# Patient Record
Sex: FEMALE | Race: WHITE | NOT HISPANIC OR LATINO | URBAN - METROPOLITAN AREA
[De-identification: names, ages, dates, MRNs, and addresses within clinical notes are randomized per-mention and may not be internally consistent; named-entity substitution may affect disease eponyms.]

---

## 2017-01-31 ENCOUNTER — INPATIENT (INPATIENT)
Facility: HOSPITAL | Age: 57
LOS: 12 days | Discharge: NURSING, REHAB OR LONG-TERM CARE | DRG: 917 | End: 2017-02-13
Attending: HOSPITALIST | Admitting: EMERGENCY MEDICINE
Payer: MEDICARE

## 2017-01-31 VITALS
OXYGEN SATURATION: 97 % | DIASTOLIC BLOOD PRESSURE: 90 MMHG | SYSTOLIC BLOOD PRESSURE: 146 MMHG | WEIGHT: 250 LBS | TEMPERATURE: 99 F | HEART RATE: 86 BPM | HEIGHT: 66 IN | RESPIRATION RATE: 22 BRPM

## 2017-01-31 DIAGNOSIS — F20.9 SCHIZOPHRENIA, UNSPECIFIED: ICD-10-CM

## 2017-01-31 DIAGNOSIS — R41.82 ALTERED MENTAL STATUS, UNSPECIFIED: ICD-10-CM

## 2017-01-31 DIAGNOSIS — T42.3X4A: ICD-10-CM

## 2017-01-31 LAB
ALBUMIN SERPL ELPH-MCNC: 4.1 G/DL — SIGNIFICANT CHANGE UP (ref 3.3–5.2)
ALP SERPL-CCNC: 117 U/L — SIGNIFICANT CHANGE UP (ref 40–120)
ALT FLD-CCNC: 14 U/L — SIGNIFICANT CHANGE UP
AMMONIA BLD-MCNC: 18 UMOL/L — SIGNIFICANT CHANGE UP (ref 11–55)
AMPHET UR-MCNC: NEGATIVE — SIGNIFICANT CHANGE UP
ANION GAP SERPL CALC-SCNC: 13 MMOL/L — SIGNIFICANT CHANGE UP (ref 5–17)
APAP SERPL-MCNC: <9.3 UG/ML — LOW (ref 10–26)
APPEARANCE UR: CLEAR — SIGNIFICANT CHANGE UP
APTT BLD: 31.8 SEC — SIGNIFICANT CHANGE UP (ref 27.5–37.4)
AST SERPL-CCNC: 18 U/L — SIGNIFICANT CHANGE UP
BACTERIA # UR AUTO: ABNORMAL
BARBITURATES UR SCN-MCNC: POSITIVE
BASOPHILS # BLD AUTO: 0 K/UL — SIGNIFICANT CHANGE UP (ref 0–0.2)
BASOPHILS NFR BLD AUTO: 0.2 % — SIGNIFICANT CHANGE UP (ref 0–2)
BENZODIAZ UR-MCNC: NEGATIVE — SIGNIFICANT CHANGE UP
BILIRUB SERPL-MCNC: 0.8 MG/DL — SIGNIFICANT CHANGE UP (ref 0.4–2)
BILIRUB UR-MCNC: NEGATIVE — SIGNIFICANT CHANGE UP
BUN SERPL-MCNC: 15 MG/DL — SIGNIFICANT CHANGE UP (ref 8–20)
CALCIUM SERPL-MCNC: 9.1 MG/DL — SIGNIFICANT CHANGE UP (ref 8.6–10.2)
CHLORIDE SERPL-SCNC: 95 MMOL/L — LOW (ref 98–107)
CO2 SERPL-SCNC: 27 MMOL/L — SIGNIFICANT CHANGE UP (ref 22–29)
COCAINE METAB.OTHER UR-MCNC: NEGATIVE — SIGNIFICANT CHANGE UP
COLOR SPEC: YELLOW — SIGNIFICANT CHANGE UP
CREAT SERPL-MCNC: 0.78 MG/DL — SIGNIFICANT CHANGE UP (ref 0.5–1.3)
DIFF PNL FLD: ABNORMAL
EPI CELLS # UR: SIGNIFICANT CHANGE UP
ETHANOL SERPL-MCNC: <10 MG/DL — SIGNIFICANT CHANGE UP
GAS PNL BLDA: SIGNIFICANT CHANGE UP
GLUCOSE SERPL-MCNC: 166 MG/DL — HIGH (ref 70–115)
GLUCOSE UR QL: NEGATIVE MG/DL — SIGNIFICANT CHANGE UP
HCT VFR BLD CALC: 42.5 % — SIGNIFICANT CHANGE UP (ref 37–47)
HGB BLD-MCNC: 13.8 G/DL — SIGNIFICANT CHANGE UP (ref 12–16)
INR BLD: 1.14 RATIO — SIGNIFICANT CHANGE UP (ref 0.88–1.16)
KETONES UR-MCNC: NEGATIVE — SIGNIFICANT CHANGE UP
LACTATE BLDV-MCNC: 2 MMOL/L — HIGH (ref 0.5–1.6)
LEUKOCYTE ESTERASE UR-ACNC: NEGATIVE — SIGNIFICANT CHANGE UP
LYMPHOCYTES # BLD AUTO: 1 K/UL — SIGNIFICANT CHANGE UP (ref 1–4.8)
LYMPHOCYTES # BLD AUTO: 15 % — LOW (ref 20–55)
MCHC RBC-ENTMCNC: 29.4 PG — SIGNIFICANT CHANGE UP (ref 27–31)
MCHC RBC-ENTMCNC: 32.5 G/DL — SIGNIFICANT CHANGE UP (ref 32–36)
MCV RBC AUTO: 90.6 FL — SIGNIFICANT CHANGE UP (ref 81–99)
METHADONE UR-MCNC: NEGATIVE — SIGNIFICANT CHANGE UP
MONOCYTES # BLD AUTO: 0.9 K/UL — HIGH (ref 0–0.8)
MONOCYTES NFR BLD AUTO: 14.2 % — HIGH (ref 3–10)
NEUTROPHILS # BLD AUTO: 4.7 K/UL — SIGNIFICANT CHANGE UP (ref 1.8–8)
NEUTROPHILS NFR BLD AUTO: 70.4 % — SIGNIFICANT CHANGE UP (ref 37–73)
NITRITE UR-MCNC: NEGATIVE — SIGNIFICANT CHANGE UP
OPIATES UR-MCNC: NEGATIVE — SIGNIFICANT CHANGE UP
PCP SPEC-MCNC: SIGNIFICANT CHANGE UP
PCP UR-MCNC: NEGATIVE — SIGNIFICANT CHANGE UP
PH UR: 6 — SIGNIFICANT CHANGE UP (ref 4.8–8)
PLATELET # BLD AUTO: 164 K/UL — SIGNIFICANT CHANGE UP (ref 150–400)
POTASSIUM SERPL-MCNC: 3.8 MMOL/L — SIGNIFICANT CHANGE UP (ref 3.5–5.3)
POTASSIUM SERPL-SCNC: 3.8 MMOL/L — SIGNIFICANT CHANGE UP (ref 3.5–5.3)
PROT SERPL-MCNC: 6.6 G/DL — SIGNIFICANT CHANGE UP (ref 6.6–8.7)
PROT UR-MCNC: NEGATIVE MG/DL — SIGNIFICANT CHANGE UP
PROTHROM AB SERPL-ACNC: 12.6 SEC — SIGNIFICANT CHANGE UP (ref 10–13.1)
RAPID RVP RESULT: SIGNIFICANT CHANGE UP
RBC # BLD: 4.69 M/UL — SIGNIFICANT CHANGE UP (ref 4.4–5.2)
RBC # FLD: 14.2 % — SIGNIFICANT CHANGE UP (ref 11–15.6)
RBC CASTS # UR COMP ASSIST: ABNORMAL /HPF (ref 0–4)
SALICYLATES SERPL-MCNC: <2 MG/DL — LOW (ref 10–20)
SODIUM SERPL-SCNC: 135 MMOL/L — SIGNIFICANT CHANGE UP (ref 135–145)
SP GR SPEC: 1.01 — SIGNIFICANT CHANGE UP (ref 1.01–1.02)
THC UR QL: NEGATIVE — SIGNIFICANT CHANGE UP
UROBILINOGEN FLD QL: NEGATIVE MG/DL — SIGNIFICANT CHANGE UP
WBC # BLD: 6.61 K/UL — SIGNIFICANT CHANGE UP (ref 4.8–10.8)
WBC # FLD AUTO: 6.61 K/UL — SIGNIFICANT CHANGE UP (ref 4.8–10.8)
WBC UR QL: SIGNIFICANT CHANGE UP

## 2017-01-31 PROCEDURE — 72125 CT NECK SPINE W/O DYE: CPT | Mod: 26

## 2017-01-31 PROCEDURE — 93010 ELECTROCARDIOGRAM REPORT: CPT

## 2017-01-31 PROCEDURE — 99291 CRITICAL CARE FIRST HOUR: CPT

## 2017-01-31 PROCEDURE — 70450 CT HEAD/BRAIN W/O DYE: CPT | Mod: 26

## 2017-01-31 PROCEDURE — 99222 1ST HOSP IP/OBS MODERATE 55: CPT

## 2017-01-31 PROCEDURE — 71010: CPT | Mod: 26

## 2017-01-31 PROCEDURE — 95819 EEG AWAKE AND ASLEEP: CPT | Mod: 26

## 2017-01-31 RX ORDER — CALCIUM GLUCONATE 100 MG/ML
2 VIAL (ML) INTRAVENOUS ONCE
Qty: 0 | Refills: 0 | Status: DISCONTINUED | OUTPATIENT
Start: 2017-01-31 | End: 2017-01-31

## 2017-01-31 RX ORDER — ENOXAPARIN SODIUM 100 MG/ML
40 INJECTION SUBCUTANEOUS EVERY 24 HOURS
Qty: 0 | Refills: 0 | Status: DISCONTINUED | OUTPATIENT
Start: 2017-01-31 | End: 2017-02-05

## 2017-01-31 RX ORDER — SODIUM CHLORIDE 9 MG/ML
3 INJECTION INTRAMUSCULAR; INTRAVENOUS; SUBCUTANEOUS ONCE
Qty: 0 | Refills: 0 | Status: COMPLETED | OUTPATIENT
Start: 2017-01-31 | End: 2017-01-31

## 2017-01-31 RX ORDER — SODIUM CHLORIDE 9 MG/ML
1000 INJECTION, SOLUTION INTRAVENOUS
Qty: 0 | Refills: 0 | Status: DISCONTINUED | OUTPATIENT
Start: 2017-01-31 | End: 2017-02-01

## 2017-01-31 RX ADMIN — ENOXAPARIN SODIUM 40 MILLIGRAM(S): 100 INJECTION SUBCUTANEOUS at 07:27

## 2017-01-31 RX ADMIN — SODIUM CHLORIDE 3 MILLILITER(S): 9 INJECTION INTRAMUSCULAR; INTRAVENOUS; SUBCUTANEOUS at 03:03

## 2017-01-31 RX ADMIN — SODIUM CHLORIDE 100 MILLILITER(S): 9 INJECTION, SOLUTION INTRAVENOUS at 07:26

## 2017-01-31 RX ADMIN — Medication 1 MILLIGRAM(S): at 03:03

## 2017-01-31 NOTE — ED PROVIDER NOTE - PROGRESS NOTE DETAILS
awaiting ICU eval and waking up after valium at er awaiting ICU eval and waking up  ativan given in ER after possible seizure.  labs and imaging reviewed. pt evaluated and accepted by ICU.

## 2017-01-31 NOTE — ED PROVIDER NOTE - OBJECTIVE STATEMENT
55 yo female with no known PMH bibems for AMS.  pt last seen nml last nite.   roommate states pt fell out of bed.   pt is unresponsive.  no known trauma or drug use.

## 2017-01-31 NOTE — ED PROVIDER NOTE - NEUROLOGICAL, MLM
lethargic. initially appeared to be neglecting left side, but then started to localize pt with both sides.

## 2017-01-31 NOTE — H&P ADULT. - ATTENDING COMMENTS
56 yof presenting with acute encephalopathy likely secondary to medications.    - Hold sedating medications  - patietn slowly improving  - appreciate psych input  - paratracheal mass seen on CT scan, per patietns  patient as hx of breast ca s/p lumpectomy, chemo and radiation currently [facility], will try to obtain more information and patietn will likely need CT neck and chest for further evaluation 56 yof presenting with acute encephalopathy likely secondary to medications.    - Hold sedating medications  - gerri slowly improving  - appreciate psych input  - ? mass seen on CXR, per patikimberly  patient as hx of breast ca s/p lumpectomy and radiation at [facility], will try to obtain more information and patient will likely need CT neck and chest for further evaluation

## 2017-01-31 NOTE — ED PROVIDER NOTE - MUSCULOSKELETAL, MLM
Spine appears normal, range of motion is not limited, no muscle or joint tenderness Spine appears normal, atraumatic

## 2017-01-31 NOTE — ED ADULT TRIAGE NOTE - CHIEF COMPLAINT QUOTE
pt alert, + AMS incomprehensible sounds, as per ems, unknown last known normal, was found by room mate s/p fall out of bed, normally alert and oriented, blood sugar 130, Dr and code team called to bedside.

## 2017-01-31 NOTE — ED PROVIDER NOTE - CRITICAL CARE PROVIDED
consult w/ pt's family directly relating to pts condition/consultation with other physicians/direct patient care (not related to procedure)/conducted a detailed discussion of DNR status/additional history taking/documentation

## 2017-01-31 NOTE — H&P ADULT. - ASSESSMENT
Impression/Plan:  1) Altered mental status/obtundation: Likely medication related, given her history, presentation and multiple potentially sedating meds which she is on. Additionally the urine tox shows barbituates, but her medication list doesnt reflect a barbituate. I question if this may be related to the lamictal. At present there is no signs of seizures, nor was there evidence on her presentation. An EEG will be performed to exclude non-convulsive state, however without prior history, seizure is less likely in this case. Will hold off on LP at this point, with no meningeal signs, Fever or other sign of infection.    2) Schizophrenia: WIll hold her meds at this point and continue gentle hydration. I have asked psychiatry to evaluate and assist with evaluating her meds.    3) Fall from bed: Neck Ct shows no acute Cervical injury, although there is degenerative changes noted. Will leave Cervical collar in place until more awake and complete examination.  Will need to contact her group home and outside psychiatrist. Will also need to attempt to locate family. Will ask for Social work assistance     I spent a total of 35 mins evaluating and treating this critical patient Impression/Plan:  1) Altered mental status/obtundation: Likely medication related, given her history, presentation and multiple potentially sedating meds which she is on. Additionally the urine tox shows barbituates, but her medication list doesnt reflect a barbituate. I question if this may be related to the lamictal. At present there is no signs of seizures, nor was there evidence on her presentation. An EEG will be performed to exclude non-convulsive state, however without prior history, seizure is less likely in this case. Will hold off on LP at this point, with no meningeal signs, Fever or other sign of infection.    2) Schizophrenia: WIll hold her meds at this point and continue gentle hydration. I have asked psychiatry to evaluate and assist with evaluating her meds.    3) Fall from bed: Neck Ct shows no acute Cervical injury, although there is degenerative changes noted. Will leave Cervical collar in place until more awake and complete examination.    4) R Paratracheal Mass: Will need CT Chest with contrast when awake, to further evaluate. At present it is not compromising the patient or her breathing.    Will need to contact her group home and outside psychiatrist. Will also need to attempt to locate family. Will ask for Social work assistance     I spent a total of 35 mins evaluating and treating this critical patient

## 2017-01-31 NOTE — ED ADULT NURSE REASSESSMENT NOTE - NS ED NURSE REASSESS COMMENT FT1
pt received responsive only to pain. c-collar in place right nasal trumpet in place with o2 via nasal cannula. iv infusing well no redness or swelling

## 2017-01-31 NOTE — H&P ADULT. - HISTORY OF PRESENT ILLNESS
56F with a history of schizoaffective disorder, who lives in a group home. Brought to the ER found having fallen out of bed and being unresponsive. Brought to the ER, with stable BP, HR and comfortably breathing and protecting her airway.  Stat Head Ct was negative for cute CVA/bleed. Her initial labs and EKG were unremarkable, although her urine tox was + for barbituates. The alcohol/acetaminophen/salicylate levels were negative. There were was no fever and no menningeal signs. She was started on IV fluids and brought to the ICU for severe obtundation, with concerns for airway protection.  In reviewing her medication list, she is currently on multiple potentially sedating medications including Clozapine, Seroquel, and Lamicatal.

## 2017-01-31 NOTE — ED PROVIDER NOTE - CARE PLAN
Principal Discharge DX:	Altered mental state  Secondary Diagnosis:	Seizure Principal Discharge DX:	Altered mental state

## 2017-01-31 NOTE — ED PROVIDER NOTE - ENMT, MLM
Airway patent, Nasal mucosa clear. Mouth with normal mucosa. atraumatic. Airway patent, Nasal mucosa clear. Mouth with normal mucosa. atraumatic. has gag but tolerated nasal trumpet.

## 2017-02-01 DIAGNOSIS — I10 ESSENTIAL (PRIMARY) HYPERTENSION: ICD-10-CM

## 2017-02-01 DIAGNOSIS — R22.2 LOCALIZED SWELLING, MASS AND LUMP, TRUNK: ICD-10-CM

## 2017-02-01 DIAGNOSIS — G93.40 ENCEPHALOPATHY, UNSPECIFIED: ICD-10-CM

## 2017-02-01 DIAGNOSIS — T50.902D POISONING BY UNSPECIFIED DRUGS, MEDICAMENTS AND BIOLOGICAL SUBSTANCES, INTENTIONAL SELF-HARM, SUBSEQUENT ENCOUNTER: ICD-10-CM

## 2017-02-01 LAB
CULTURE RESULTS: NO GROWTH — SIGNIFICANT CHANGE UP
SPECIMEN SOURCE: SIGNIFICANT CHANGE UP

## 2017-02-01 PROCEDURE — 99232 SBSQ HOSP IP/OBS MODERATE 35: CPT

## 2017-02-01 PROCEDURE — 99233 SBSQ HOSP IP/OBS HIGH 50: CPT

## 2017-02-01 PROCEDURE — 93010 ELECTROCARDIOGRAM REPORT: CPT

## 2017-02-01 RX ADMIN — SODIUM CHLORIDE 100 MILLILITER(S): 9 INJECTION, SOLUTION INTRAVENOUS at 06:18

## 2017-02-01 RX ADMIN — ENOXAPARIN SODIUM 40 MILLIGRAM(S): 100 INJECTION SUBCUTANEOUS at 06:18

## 2017-02-01 NOTE — PROGRESS NOTE ADULT - ASSESSMENT
56 y F hx schizophrenia, HTN, recently dx'd R breast CA s/p lumpectomy/chemo, adm for AMS, intentional drug OD.     Encephalopathy: 2/2 drug OD, MS improving.   Drug overdose: psychiatry following, 1:1 dc'd. cont to monitor. follow EKG for prolonged QT.   Schizophrenia: f/u psych recs, meds on hold for now  HTN: resume meds as tolerated, BP stable  Lung mass on cxr: f/u ct chest     prophyl: lovenox

## 2017-02-01 NOTE — PROGRESS NOTE ADULT - ASSESSMENT
Pt is a 56 yof with schizophrenia presenting with acute encephalopathy secondary to intentional drug over dose, also incidental finding on cxr ? mass/lymphnode/azygous vein will obtain CT scan

## 2017-02-01 NOTE — PROGRESS NOTE ADULT - SUBJECTIVE AND OBJECTIVE BOX
[facility]  Medicine Accept Note     Chief Complaint: Patient is a 56y old  Female who presents with a chief complaint of Altered mental status (2017 11:26)      PAST MEDICAL & SURGICAL HISTORY:  Schizophrenia  HTN  No significant past surgical history      HPI/OVERNIGHT EVENTS: 56 y F hx schizoaffective d/o, R breast CA s/p recent lumpectomy/chemo,  HTN BIBEMS for altered mental status, intentional drug overdose. Pt denies any active SI, has been seen by psychiatry, 1:1 dc'd.  Denies CP, SOB, N/V/D, dysuria, cough.      MEDICATIONS  (STANDING):  enoxaparin Injectable 40milliGRAM(s) SubCutaneous every 24 hours      Allergies: Flagyl (Rash)      REVIEW OF SYSTEMS:    CONSTITUTIONAL: No fever  ENMT:  No difficulty hearing, tinnitus, vertigo; No sinus or throat pain  NECK: No pain or stiffness  RESPIRATORY: No cough, wheezing, chills or hemoptysis; No shortness of breath  CARDIOVASCULAR: No chest pain, palpitations, dizziness, or leg swelling  GASTROINTESTINAL: No abdominal or epigastric pain. No nausea, vomiting, or hematemesis; No diarrhea or constipation. No melena or hematochezia.  GENITOURINARY: No dysuria, frequency, hematuria, or incontinence  NEUROLOGICAL: No headaches, memory loss, loss of strength, numbness; +chronic hand tremor  SKIN: No itching, burning, rashes, or lesions   MUSCULOSKELETAL: No joint pain or swelling; No muscle, back, or extremity pain       Vital Signs Last 24 Hrs  T(C): 37, Max: 37 (- @ 12:00)  T(F): 98.6, Max: 98.6 (- @ 12:00)  HR: 103 (70 - 103)  BP: 142/92 (115/69 - 142/92)  BP(mean): 102 (85 - 111)  RR: 18 (11 - 58)  SpO2: 98% (94% - 98%)    PHYSICAL EXAM:  Constitutional: alert, NAD  HEENT: EOMI  Neck: supple   Respiratory: CTAB   Cardiovascular: S1 and S2, RRR, no M   Gastrointestinal: BS+, soft, NT/ND  Extremities: No peripheral edema  Vascular: 2+ peripheral pulses  Neurological: A/O x 2 "2007", no focal deficits, +resting hand tremor    Musculoskeletal: 5/5 strength b/l upper and lower extremities  Skin: No rashes        CAPILLARY BLOOD GLUCOSE  126 (2017 07:55)  130 (2017 02:49)    LABS:                        13.8   6.61  )-----------( 164      ( 2017 03:32 )             42.5     2017 03:32    135    |  95     |  15.0   ----------------------------<  166    3.8     |  27.0   |  0.78     Ca    9.1        2017 03:32    TPro  6.6    /  Alb  4.1    /  TBili  0.8    /  DBili  x      /  AST  18     /  ALT  14     /  AlkPhos  117    2017 03:32    PT/INR - ( 2017 03:32 )   PT: 12.6 sec;   INR: 1.14 ratio         PTT - ( 2017 03:32 )  PTT:31.8 sec  Urinalysis Basic - ( 2017 04:18 )    Color: Yellow / Appearance: Clear / S.010 / pH: x  Gluc: x / Ketone: Negative  / Bili: Negative / Urobili: Negative mg/dL   Blood: x / Protein: Negative mg/dL / Nitrite: Negative   Leuk Esterase: Negative / RBC: 3-5 /HPF / WBC 0-2   Sq Epi: x / Non Sq Epi: Few / Bacteria: Occasional    EKG : Normal sinus rhythm  Left axis deviation  Incomplete right bundle branch block  Prolonged QT  Abnormal ECG      RADIOLOGY & ADDITIONAL TESTS:  CXR :  Right tracheobronchial angle and soft tissues a mass of   indeterminate etiology measuring 2.8 cm. Follow-up CT with IV contrast   recommended..

## 2017-02-01 NOTE — PROGRESS NOTE ADULT - SUBJECTIVE AND OBJECTIVE BOX
Patient is a 56y old  Female who presents with a chief complaint of Altered mental status (2017 11:26)      BRIEF HOSPITAL COURSE: Pt is a 56 yof presenting with encephalopathy       Events last 24 hours: MS much improved patient endorsed taking extra medication 3-4 days worth. She states she took them because she thought she was going to have to go to [facility]. She denies suicidal ideation. She states she was treated for her breast CA with lumpectomy and radiation, no chem and she is finished with treatment and has a follow up visit pending    PAST MEDICAL & SURGICAL HISTORY:  Schizophrenia  No significant past surgical history      Review of Systems:  CONSTITUTIONAL: No fever, chills, or fatigue  EYES: No eye pain, visual disturbances, or discharge  ENMT:  No difficulty hearing, tinnitus, vertigo; No sinus or throat pain  NECK: No pain or stiffness  RESPIRATORY: No cough, wheezing, chills or hemoptysis; No shortness of breath  CARDIOVASCULAR: No chest pain, palpitations, dizziness, or leg swelling  GASTROINTESTINAL: No abdominal or epigastric pain. No nausea, vomiting, or hematemesis; No diarrhea or constipation. No melena or hematochezia.  GENITOURINARY: No dysuria, frequency, hematuria, or incontinence  NEUROLOGICAL: No headaches, memory loss, loss of strength, numbness, or tremors  SKIN: No itching, burning, rashes, or lesions   MUSCULOSKELETAL: No joint pain or swelling; No muscle, back, or extremity pain  PSYCHIATRIC: No depression, anxiety, mood swings, or difficulty sleeping      Medications:    enoxaparin Injectable 40milliGRAM(s) SubCutaneous every 24 hours    ICU Vital Signs Last 24 Hrs  T(C): 36.5, Max: 36.5 ( @ 03:00)  T(F): 97.7, Max: 97.7 ( @ 03:00)  HR: 91 (67 - 91)  BP: 129/78 (92/53 - 146/100)  BP(mean): 99 (66 - 115)  ABP: --  ABP(mean): --  RR: 26 (11 - 58)  SpO2: 95% (93% - 98%)      ABG - ( 2017 03:09 )  pH: 7.40  /  pCO2: 44    /  pO2: 221   / HCO3: 26    / Base Excess: 2.2   /  SaO2: 100       I&O's Detail  I & Os for 24h ending 2017 07:00  =============================================  IN:    plasma-Lyte A: 2400 ml    Total IN: 2400 ml  ---------------------------------------------  OUT:    Indwelling Catheter - Urethral: 3255 ml    Intermittent Catheterization - Urethral: 1100 ml    Total OUT: 4355 ml  ---------------------------------------------  Total NET: -1955 ml    I & Os for current day (as of 2017 11:14)  =============================================  IN:    plasma-Lyte A: 300 ml    Total IN: 300 ml  ---------------------------------------------  OUT:    Indwelling Catheter - Urethral: 375 ml    Total OUT: 375 ml  ---------------------------------------------  Total NET: -75 ml        LABS:                        13.8   6.61  )-----------( 164      ( 2017 03:32 )             42.5     2017 03:32    135    |  95     |  15.0   ----------------------------<  166    3.8     |  27.0   |  0.78     Ca    9.1        2017 03:32    TPro  6.6    /  Alb  4.1    /  TBili  0.8    /  DBili  x      /  AST  18     /  ALT  14     /  AlkPhos  117    2017 03:32      CARDIAC MARKERS ( 2017 03:32 )  x     / <0.01 ng/mL / 41 U/L / x     / x          CAPILLARY BLOOD GLUCOSE  126 (2017 07:55)    PT/INR - ( 2017 03:32 )   PT: 12.6 sec;   INR: 1.14 ratio         PTT - ( 2017 03:32 )  PTT:31.8 sec  Urinalysis Basic - ( 2017 04:18 )    Color: Yellow / Appearance: Clear / S.010 / pH: x  Gluc: x / Ketone: Negative  / Bili: Negative / Urobili: Negative mg/dL   Blood: x / Protein: Negative mg/dL / Nitrite: Negative   Leuk Esterase: Negative / RBC: 3-5 /HPF / WBC 0-2   Sq Epi: x / Non Sq Epi: Few / Bacteria: Occasional      CULTURES:  Rapid RVP Result: NotDetec ( @ 05:29)  Culture Results:   No growth ( @ 04:18)      Physical Examination:    General: No acute distress.  Alert, oriented, interactive, nonfocal    HEENT: Pupils equal, reactive to light.  Symmetric.    PULM: Clear to auscultation bilaterally, no significant sputum production    CVS: Regular rate and rhythm, no murmurs, rubs, or gallops    ABD: Soft, nondistended, nontender, normoactive bowel sounds, no masses    EXT: No edema, nontender    SKIN: Warm and well perfused, no rashes noted.

## 2017-02-01 NOTE — PROGRESS NOTE ADULT - SUBJECTIVE AND OBJECTIVE BOX
see written note in chart  patient has capacity to sign informed consent  needs CT chest w contrast  recent diagnosis stage 1 breast cancer  restart seroquel 50 mg BID if EKG OK i.e. QTc not prolonged  hold clozapine

## 2017-02-02 LAB
APPEARANCE UR: CLEAR — SIGNIFICANT CHANGE UP
BILIRUB UR-MCNC: NEGATIVE — SIGNIFICANT CHANGE UP
COLOR SPEC: YELLOW — SIGNIFICANT CHANGE UP
DIFF PNL FLD: ABNORMAL
GLUCOSE UR QL: NEGATIVE MG/DL — SIGNIFICANT CHANGE UP
KETONES UR-MCNC: ABNORMAL
LEUKOCYTE ESTERASE UR-ACNC: ABNORMAL
NITRITE UR-MCNC: NEGATIVE — SIGNIFICANT CHANGE UP
PH UR: 5 — SIGNIFICANT CHANGE UP (ref 4.8–8)
PROT UR-MCNC: 30 MG/DL
SP GR SPEC: 1.02 — SIGNIFICANT CHANGE UP (ref 1.01–1.02)
UROBILINOGEN FLD QL: NEGATIVE MG/DL — SIGNIFICANT CHANGE UP

## 2017-02-02 PROCEDURE — 99231 SBSQ HOSP IP/OBS SF/LOW 25: CPT

## 2017-02-02 PROCEDURE — 93010 ELECTROCARDIOGRAM REPORT: CPT | Mod: 77

## 2017-02-02 PROCEDURE — 99232 SBSQ HOSP IP/OBS MODERATE 35: CPT

## 2017-02-02 PROCEDURE — 99233 SBSQ HOSP IP/OBS HIGH 50: CPT

## 2017-02-02 PROCEDURE — 71250 CT THORAX DX C-: CPT | Mod: 26

## 2017-02-02 RX ORDER — PRIMIDONE 250 MG/1
50 TABLET ORAL
Qty: 0 | Refills: 0 | Status: DISCONTINUED | OUTPATIENT
Start: 2017-02-02 | End: 2017-02-13

## 2017-02-02 RX ORDER — LAMOTRIGINE 25 MG/1
100 TABLET, ORALLY DISINTEGRATING ORAL
Qty: 0 | Refills: 0 | Status: DISCONTINUED | OUTPATIENT
Start: 2017-02-02 | End: 2017-02-03

## 2017-02-02 RX ORDER — SODIUM CHLORIDE 9 MG/ML
1000 INJECTION INTRAMUSCULAR; INTRAVENOUS; SUBCUTANEOUS
Qty: 0 | Refills: 0 | Status: DISCONTINUED | OUTPATIENT
Start: 2017-02-02 | End: 2017-02-04

## 2017-02-02 RX ORDER — PANTOPRAZOLE SODIUM 20 MG/1
40 TABLET, DELAYED RELEASE ORAL
Qty: 0 | Refills: 0 | Status: DISCONTINUED | OUTPATIENT
Start: 2017-02-02 | End: 2017-02-13

## 2017-02-02 RX ORDER — ACETAMINOPHEN 500 MG
650 TABLET ORAL ONCE
Qty: 0 | Refills: 0 | Status: COMPLETED | OUTPATIENT
Start: 2017-02-02 | End: 2017-02-02

## 2017-02-02 RX ADMIN — ENOXAPARIN SODIUM 40 MILLIGRAM(S): 100 INJECTION SUBCUTANEOUS at 06:35

## 2017-02-02 RX ADMIN — LAMOTRIGINE 100 MILLIGRAM(S): 25 TABLET, ORALLY DISINTEGRATING ORAL at 17:14

## 2017-02-02 RX ADMIN — Medication 650 MILLIGRAM(S): at 23:08

## 2017-02-02 RX ADMIN — PANTOPRAZOLE SODIUM 40 MILLIGRAM(S): 20 TABLET, DELAYED RELEASE ORAL at 17:15

## 2017-02-02 RX ADMIN — PRIMIDONE 50 MILLIGRAM(S): 250 TABLET ORAL at 17:14

## 2017-02-02 RX ADMIN — SODIUM CHLORIDE 100 MILLILITER(S): 9 INJECTION INTRAMUSCULAR; INTRAVENOUS; SUBCUTANEOUS at 23:08

## 2017-02-02 NOTE — PROGRESS NOTE ADULT - SUBJECTIVE AND OBJECTIVE BOX
Internal Medicine Hospitalist - Dr. Carlie MAYBERRY    [id number]    56y      Female    Patient is a 56y old  Female who presents with a chief complaint of Altered mental status (31 Jan 2017 11:26)    HPI:  56F with a history of schizoaffective disorder, who lives in a group home. Brought to the ER found having fallen out of bed and being unresponsive. Brought to the ER, with stable BP, HR and comfortably breathing and protecting her airway.  Stat Head Ct was negative for cute CVA/bleed. Her initial labs and EKG were unremarkable, although her urine tox was + for barbituates. The alcohol/acetaminophen/salicylate levels were negative. There were was no fever and no menningeal signs. She was started on IV fluids and brought to the ICU for severe obtundation, with concerns for airway protection.  In reviewing her medication list, she is currently on multiple potentially sedating medications including Clozapine, Seroquel, and Lamicatal. (31 Jan 2017 11:26)      INTERVAL HPI/ OVERNIGHT EVENTS: Patient is seen and examined, pt is awake but confused, cont to talk about brother and niece, denied abd. pain, nausea, vomiting, chest pain.     REVIEW OF SYSTEMS:    Denied fever, chills, abd. pain, nausea, vomiting, chest pain, SOB, headache, dizziness    PHYSICAL EXAM:    Vital Signs Last 24 Hrs  T(C): 31.7, Max: 37 (02-01 @ 12:00)  T(F): 89, Max: 98.6 (02-01 @ 12:00)  HR: 89 (89 - 103)  BP: 122/85 (115/94 - 143/83)  BP(mean): 102 (97 - 102)  RR: 18 (16 - 31)  SpO2: 98% (95% - 98%)    GENERAL: confused   HEENT: EOMI  Neck: supple  CHEST/LUNG: positive air entry b/l   HEART: S1S2+ audible  ABDOMEN: Soft, Nontender, Nondistended; Bowel sounds present  EXTREMITIES:  no edema  CNS: AAO X 3  Psychiatry: normal mood    LABS: No new labs       MEDICATIONS  (STANDING):  enoxaparin Injectable 40milliGRAM(s) SubCutaneous every 24 hours    MEDICATIONS  (PRN):      RADIOLOGY & ADDITIONAL TEST

## 2017-02-02 NOTE — PROGRESS NOTE ADULT - SUBJECTIVE AND OBJECTIVE BOX
see written note in chart  impaired decision making   restart Mysoline and Lamictal  surrogate consent from brother  check EKG due to prolonged QT interval

## 2017-02-02 NOTE — PROGRESS NOTE ADULT - ASSESSMENT
56 y F hx schizophrenia, HTN, recently dx'd R breast CA s/p lumpectomy/chemo, adm for AMS, intentional drug OD.     A/P    >Encephalopathy: 2/2 drug OD, MS awake, oriented, but confused  appreciate psychiatry input, 1:1 dc'd. cont to monitor. EKG 02/01/2017 prolong qtc, repeat ekg today     >Schizophrenia: f/u psych recs, meds on hold for now    >HTN: Stable   monitor BP, cont. to hold medication for now     >Lung mass on cxr  Pt is awake but confused, will wait for psych to see if pt has capacity to make decision, no NOK listed in chart  if patient has no capacity, will do CT chest without contrast     >DVT: lovenox

## 2017-02-03 LAB
ANION GAP SERPL CALC-SCNC: 19 MMOL/L — HIGH (ref 5–17)
BACTERIA # UR AUTO: ABNORMAL
BUN SERPL-MCNC: 15 MG/DL — SIGNIFICANT CHANGE UP (ref 8–20)
CALCIUM SERPL-MCNC: 9.1 MG/DL — SIGNIFICANT CHANGE UP (ref 8.6–10.2)
CHLORIDE SERPL-SCNC: 106 MMOL/L — SIGNIFICANT CHANGE UP (ref 98–107)
CO2 SERPL-SCNC: 21 MMOL/L — LOW (ref 22–29)
CREAT SERPL-MCNC: 0.61 MG/DL — SIGNIFICANT CHANGE UP (ref 0.5–1.3)
EPI CELLS # UR: SIGNIFICANT CHANGE UP
GLUCOSE SERPL-MCNC: 108 MG/DL — SIGNIFICANT CHANGE UP (ref 70–115)
HCT VFR BLD CALC: 42.7 % — SIGNIFICANT CHANGE UP (ref 37–47)
HCT VFR BLD CALC: 43.9 % — SIGNIFICANT CHANGE UP (ref 37–47)
HGB BLD-MCNC: 14.1 G/DL — SIGNIFICANT CHANGE UP (ref 12–16)
HGB BLD-MCNC: 14.3 G/DL — SIGNIFICANT CHANGE UP (ref 12–16)
LACTATE BLDV-MCNC: 0.8 MMOL/L — SIGNIFICANT CHANGE UP (ref 0.5–1.6)
LACTATE BLDV-MCNC: 0.9 MMOL/L — SIGNIFICANT CHANGE UP (ref 0.5–1.6)
LYMPHOCYTES # BLD AUTO: 8 % — LOW (ref 20–55)
MAGNESIUM SERPL-MCNC: 2 MG/DL — SIGNIFICANT CHANGE UP (ref 1.8–2.5)
MCHC RBC-ENTMCNC: 29.6 PG — SIGNIFICANT CHANGE UP (ref 27–31)
MCHC RBC-ENTMCNC: 30.1 PG — SIGNIFICANT CHANGE UP (ref 27–31)
MCHC RBC-ENTMCNC: 32.6 G/DL — SIGNIFICANT CHANGE UP (ref 32–36)
MCHC RBC-ENTMCNC: 33 G/DL — SIGNIFICANT CHANGE UP (ref 32–36)
MCV RBC AUTO: 90.9 FL — SIGNIFICANT CHANGE UP (ref 81–99)
MCV RBC AUTO: 91.2 FL — SIGNIFICANT CHANGE UP (ref 81–99)
MONOCYTES NFR BLD AUTO: 10 % — SIGNIFICANT CHANGE UP (ref 3–10)
NEUTROPHILS NFR BLD AUTO: 82 % — HIGH (ref 37–73)
PLATELET # BLD AUTO: 166 K/UL — SIGNIFICANT CHANGE UP (ref 150–400)
PLATELET # BLD AUTO: 179 K/UL — SIGNIFICANT CHANGE UP (ref 150–400)
POTASSIUM SERPL-MCNC: 3.4 MMOL/L — LOW (ref 3.5–5.3)
POTASSIUM SERPL-SCNC: 3.4 MMOL/L — LOW (ref 3.5–5.3)
RBC # BLD: 4.68 M/UL — SIGNIFICANT CHANGE UP (ref 4.4–5.2)
RBC # BLD: 4.83 M/UL — SIGNIFICANT CHANGE UP (ref 4.4–5.2)
RBC # FLD: 14.7 % — SIGNIFICANT CHANGE UP (ref 11–15.6)
RBC # FLD: 14.8 % — SIGNIFICANT CHANGE UP (ref 11–15.6)
RBC CASTS # UR COMP ASSIST: ABNORMAL /HPF (ref 0–4)
SODIUM SERPL-SCNC: 146 MMOL/L — HIGH (ref 135–145)
WBC # BLD: 9.29 K/UL — SIGNIFICANT CHANGE UP (ref 4.8–10.8)
WBC # BLD: 9.63 K/UL — SIGNIFICANT CHANGE UP (ref 4.8–10.8)
WBC # FLD AUTO: 9.29 K/UL — SIGNIFICANT CHANGE UP (ref 4.8–10.8)
WBC # FLD AUTO: 9.63 K/UL — SIGNIFICANT CHANGE UP (ref 4.8–10.8)
WBC UR QL: ABNORMAL

## 2017-02-03 PROCEDURE — 99232 SBSQ HOSP IP/OBS MODERATE 35: CPT

## 2017-02-03 PROCEDURE — 99233 SBSQ HOSP IP/OBS HIGH 50: CPT

## 2017-02-03 PROCEDURE — 71010: CPT | Mod: 26

## 2017-02-03 RX ORDER — POTASSIUM CHLORIDE 20 MEQ
40 PACKET (EA) ORAL ONCE
Qty: 0 | Refills: 0 | Status: COMPLETED | OUTPATIENT
Start: 2017-02-03 | End: 2017-02-03

## 2017-02-03 RX ORDER — LAMOTRIGINE 25 MG/1
150 TABLET, ORALLY DISINTEGRATING ORAL
Qty: 0 | Refills: 0 | Status: DISCONTINUED | OUTPATIENT
Start: 2017-02-03 | End: 2017-02-13

## 2017-02-03 RX ADMIN — SODIUM CHLORIDE 100 MILLILITER(S): 9 INJECTION INTRAMUSCULAR; INTRAVENOUS; SUBCUTANEOUS at 18:26

## 2017-02-03 RX ADMIN — Medication 40 MILLIEQUIVALENT(S): at 18:20

## 2017-02-03 RX ADMIN — LAMOTRIGINE 150 MILLIGRAM(S): 25 TABLET, ORALLY DISINTEGRATING ORAL at 19:04

## 2017-02-03 RX ADMIN — ENOXAPARIN SODIUM 40 MILLIGRAM(S): 100 INJECTION SUBCUTANEOUS at 05:20

## 2017-02-03 RX ADMIN — SODIUM CHLORIDE 100 MILLILITER(S): 9 INJECTION INTRAMUSCULAR; INTRAVENOUS; SUBCUTANEOUS at 05:18

## 2017-02-03 RX ADMIN — PRIMIDONE 50 MILLIGRAM(S): 250 TABLET ORAL at 05:14

## 2017-02-03 RX ADMIN — PANTOPRAZOLE SODIUM 40 MILLIGRAM(S): 20 TABLET, DELAYED RELEASE ORAL at 05:14

## 2017-02-03 RX ADMIN — LAMOTRIGINE 100 MILLIGRAM(S): 25 TABLET, ORALLY DISINTEGRATING ORAL at 05:14

## 2017-02-03 RX ADMIN — PRIMIDONE 50 MILLIGRAM(S): 250 TABLET ORAL at 18:20

## 2017-02-03 NOTE — PROGRESS NOTE ADULT - SUBJECTIVE AND OBJECTIVE BOX
[id number]    HPI:  56F with a history of schizoaffective disorder, who lives in a group home. Brought to the ER found having fallen out of bed and being unresponsive. Brought to the ER, with stable BP, HR and comfortably breathing and protecting her airway.  Stat Head Ct was negative for cute CVA/bleed. Her initial labs and EKG were unremarkable, although her urine tox was + for barbituates. The alcohol/acetaminophen/salicylate levels were negative. There were was no fever and no menningeal signs. She was started on IV fluids and brought to the ICU for severe obtundation, with concerns for airway protection.  In reviewing her medication list, she is currently on multiple potentially sedating medications including Clozapine, Seroquel, and Lamicatal. (31 Jan 2017 11:26)      PAST MEDICAL & SURGICAL HISTORY:  Schizophrenia  No significant past surgical history      Flagyl (Rash)      Chief Complaint: rectal temp 100.9.  nursing staff states tachycardic all day. NAD .     ROS: No complaints    Vital Signs Last 24 Hrs  T(C): 36.6, Max: 36.6 (02-02 @ 16:07)  T(F): 97.8, Max: 97.8 (02-02 @ 16:07)  HR: 102 (102 - 118)  BP: 139/89 (118/80 - 139/89)  BP(mean): --  RR: 18 (18 - 22)  SpO2: 93% (93% - 93%)    General: pt alert. nad . breathing easy and unlabored  lungs clear  heart tachy. rr  abd soft      rectal temp 100.9  hr 120  NAD  PO 95% ra      EKG  NS @ 100cc/hr  tylenol  cbc  blood culturesX 2  lactate X2  ua  cxr      Discussed with Dr Sauceda. continue to monitor, no further workup at this time. reasses prn. will sign out to hospitalist team in am

## 2017-02-03 NOTE — PROGRESS NOTE ADULT - ASSESSMENT
56 y F hx schizophrenia, HTN, recently dx'd R breast CA s/p lumpectomy/chemo, adm for AMS, intentional drug OD.     A/P    >Encephalopathy: 2/2 drug OD, MS awake, oriented, but confused  appreciate psychiatry input, 1:1 dc'd. cont to monitor. EKG 02/02/2017- qtc 476 - improved     >Schizophrenia: f/u psych recs, further management as per psych     >Fever - blood c/s resend, no focal symptoms, will hold on Abx, f/u c/s  Ct chest no infiltrate   blood c.s 01/31/2017    >Urinary retention   will d/c martell catheter, voiding trial     >HTN: Stable   monitor BP, cont. to hold medication for now     >Lung mass on cxr  CT chest no lung mass noted, ?  Hyperdense lesion in the right retroareolar breast with associated fat necrosis, will need outpatient f/u- mammogram     >DVT: lovenox 56 y F hx schizophrenia, HTN, recently dx'd R breast CA s/p lumpectomy/chemo, adm for AMS, intentional drug OD.     A/P    >Encephalopathy: 2/2 drug OD, MS awake, oriented, but confused  appreciate psychiatry input, 1:1 dc'd. cont to monitor. EKG 02/02/2017- qtc 476 - improved     >Schizophrenia: f/u psych recs, further management as per psych     >Fever - blood c/s resend, no focal symptoms, will hold on Abx, f/u c/s  Ct chest no infiltrate   blood c.s 01/31/2017    >Urinary retention   will d/c martell catheter, voiding trial     >Hypokalemia - potassium supplement  f/u BMP in am     >HTN: Stable   monitor BP, cont. to hold medication for now     >Lung mass on cxr  CT chest no lung mass noted, ?  Hyperdense lesion in the right retroareolar breast with associated fat necrosis, will need outpatient f/u- mammogram     >DVT: lovenox

## 2017-02-03 NOTE — PROGRESS NOTE ADULT - SUBJECTIVE AND OBJECTIVE BOX
Internal Medicine Hospitalist - Dr. Carlie MAYBERRY    [id number]    56y      Female    Patient is a 56y old  Female who presents with a chief complaint of Altered mental status (2017 11:26)    HPI:  56F with a history of schizoaffective disorder, who lives in a group home. Brought to the ER found having fallen out of bed and being unresponsive. Brought to the ER, with stable BP, HR and comfortably breathing and protecting her airway.  Stat Head Ct was negative for cute CVA/bleed. Her initial labs and EKG were unremarkable, although her urine tox was + for barbituates. The alcohol/acetaminophen/salicylate levels were negative. There were was no fever and no menningeal signs. She was started on IV fluids and brought to the ICU for severe obtundation, with concerns for airway protection.  In reviewing her medication list, she is currently on multiple potentially sedating medications including Clozapine, Seroquel, and Lamicatal. (2017 11:26)      INTERVAL HPI/ OVERNIGHT EVENTS: Patient is seen and examined, pt spike low grade fever last night, also noted to be tachycardiac, afebrile in am    REVIEW OF SYSTEMS:    Denied, chills, abd. pain, nausea, vomiting, chest pain, SOB, headache, dizziness    PHYSICAL EXAM:    Vital Signs Last 24 Hrs  T(C): 37.5, Max: 38.3 (- @ 23:00)  T(F): 99.5, Max: 100.9 (- @ 23:00)  HR: 119 (102 - 129)  BP: 113/76 (113/76 - 139/95)  BP(mean): --  RR: 18 (18 - 18)  SpO2: 95% (95% - 96%)    GENERAL: NAD  HEENT: EOMI  Neck: supple  CHEST/LUNG: Clear to percussion bilaterally; No wheezing  HEART: S1S2+ audible  ABDOMEN: Soft, Nontender, Nondistended; Bowel sounds present  EXTREMITIES:  no edema  CNS: AAO X 3    LABS:                        14.1   9.63  )-----------( 166      ( 2017 05:12 )             42.7     2017 05:12    146    |  106    |  15.0   ----------------------------<  108    3.4     |  21.0   |  0.61     Ca    9.1        2017 05:12  Mg     2.0       2017 05:12        Urinalysis Basic - ( 2017 23:50 )    Color: Yellow / Appearance: Clear / S.025 / pH: x  Gluc: x / Ketone: Large  / Bili: Negative / Urobili: Negative mg/dL   Blood: x / Protein: 30 mg/dL / Nitrite: Negative   Leuk Esterase: Trace / RBC: 3-5 /HPF / WBC 6-10   Sq Epi: x / Non Sq Epi: Occasional / Bacteria: Occasional          MEDICATIONS  (STANDING):  enoxaparin Injectable 40milliGRAM(s) SubCutaneous every 24 hours  pantoprazole    Tablet 40milliGRAM(s) Oral before breakfast  primidone 50milliGRAM(s) Oral two times a day  lamoTRIgine 100milliGRAM(s) Oral two times a day  sodium chloride 0.9%. 1000milliLiter(s) IV Continuous <Continuous>  potassium chloride    Tablet ER 40milliEquivalent(s) Oral once    MEDICATIONS  (PRN):      RADIOLOGY & ADDITIONAL TEST   EXAM:  CT CHEST                        PROCEDURE DATE:  2017   IMPRESSION:     No lung mass or other acute findings. Incidental finding right breast   with recommendations as above.

## 2017-02-04 LAB
ANION GAP SERPL CALC-SCNC: 16 MMOL/L — SIGNIFICANT CHANGE UP (ref 5–17)
APPEARANCE UR: CLEAR — SIGNIFICANT CHANGE UP
BACTERIA # UR AUTO: ABNORMAL
BILIRUB UR-MCNC: ABNORMAL
BUN SERPL-MCNC: 20 MG/DL — SIGNIFICANT CHANGE UP (ref 8–20)
CALCIUM SERPL-MCNC: 8.9 MG/DL — SIGNIFICANT CHANGE UP (ref 8.6–10.2)
CHLORIDE SERPL-SCNC: 106 MMOL/L — SIGNIFICANT CHANGE UP (ref 98–107)
CO2 SERPL-SCNC: 22 MMOL/L — SIGNIFICANT CHANGE UP (ref 22–29)
COLOR SPEC: YELLOW — SIGNIFICANT CHANGE UP
COMMENT - URINE: SIGNIFICANT CHANGE UP
CREAT SERPL-MCNC: 0.55 MG/DL — SIGNIFICANT CHANGE UP (ref 0.5–1.3)
D DIMER BLD IA.RAPID-MCNC: 975 NG/ML DDU — HIGH
DIFF PNL FLD: ABNORMAL
EPI CELLS # UR: SIGNIFICANT CHANGE UP
GLUCOSE SERPL-MCNC: 122 MG/DL — HIGH (ref 70–115)
GLUCOSE UR QL: NEGATIVE MG/DL — SIGNIFICANT CHANGE UP
HCT VFR BLD CALC: 42.3 % — SIGNIFICANT CHANGE UP (ref 37–47)
HGB BLD-MCNC: 13.9 G/DL — SIGNIFICANT CHANGE UP (ref 12–16)
KETONES UR-MCNC: ABNORMAL
LEUKOCYTE ESTERASE UR-ACNC: ABNORMAL
MCHC RBC-ENTMCNC: 29.5 PG — SIGNIFICANT CHANGE UP (ref 27–31)
MCHC RBC-ENTMCNC: 32.9 G/DL — SIGNIFICANT CHANGE UP (ref 32–36)
MCV RBC AUTO: 89.8 FL — SIGNIFICANT CHANGE UP (ref 81–99)
NITRITE UR-MCNC: NEGATIVE — SIGNIFICANT CHANGE UP
PH UR: 5 — SIGNIFICANT CHANGE UP (ref 4.8–8)
PLATELET # BLD AUTO: 178 K/UL — SIGNIFICANT CHANGE UP (ref 150–400)
POTASSIUM SERPL-MCNC: 3.6 MMOL/L — SIGNIFICANT CHANGE UP (ref 3.5–5.3)
POTASSIUM SERPL-SCNC: 3.6 MMOL/L — SIGNIFICANT CHANGE UP (ref 3.5–5.3)
PROT UR-MCNC: 30 MG/DL
RBC # BLD: 4.71 M/UL — SIGNIFICANT CHANGE UP (ref 4.4–5.2)
RBC # FLD: 15 % — SIGNIFICANT CHANGE UP (ref 11–15.6)
RBC CASTS # UR COMP ASSIST: ABNORMAL /HPF (ref 0–4)
SODIUM SERPL-SCNC: 144 MMOL/L — SIGNIFICANT CHANGE UP (ref 135–145)
SP GR SPEC: 1.02 — SIGNIFICANT CHANGE UP (ref 1.01–1.02)
UROBILINOGEN FLD QL: 8 MG/DL
WBC # BLD: 10.86 K/UL — HIGH (ref 4.8–10.8)
WBC # FLD AUTO: 10.86 K/UL — HIGH (ref 4.8–10.8)
WBC UR QL: ABNORMAL

## 2017-02-04 PROCEDURE — 99233 SBSQ HOSP IP/OBS HIGH 50: CPT

## 2017-02-04 PROCEDURE — 99232 SBSQ HOSP IP/OBS MODERATE 35: CPT

## 2017-02-04 RX ORDER — POTASSIUM CHLORIDE 20 MEQ
40 PACKET (EA) ORAL ONCE
Qty: 0 | Refills: 0 | Status: COMPLETED | OUTPATIENT
Start: 2017-02-04 | End: 2017-02-04

## 2017-02-04 RX ORDER — CEFTRIAXONE 500 MG/1
1 INJECTION, POWDER, FOR SOLUTION INTRAMUSCULAR; INTRAVENOUS ONCE
Qty: 0 | Refills: 0 | Status: COMPLETED | OUTPATIENT
Start: 2017-02-04 | End: 2017-02-04

## 2017-02-04 RX ADMIN — ENOXAPARIN SODIUM 40 MILLIGRAM(S): 100 INJECTION SUBCUTANEOUS at 05:54

## 2017-02-04 RX ADMIN — CEFTRIAXONE 100 GRAM(S): 500 INJECTION, POWDER, FOR SOLUTION INTRAMUSCULAR; INTRAVENOUS at 17:18

## 2017-02-04 RX ADMIN — LAMOTRIGINE 150 MILLIGRAM(S): 25 TABLET, ORALLY DISINTEGRATING ORAL at 05:59

## 2017-02-04 RX ADMIN — PRIMIDONE 50 MILLIGRAM(S): 250 TABLET ORAL at 05:54

## 2017-02-04 RX ADMIN — PANTOPRAZOLE SODIUM 40 MILLIGRAM(S): 20 TABLET, DELAYED RELEASE ORAL at 05:54

## 2017-02-04 RX ADMIN — Medication 40 MILLIEQUIVALENT(S): at 17:19

## 2017-02-04 RX ADMIN — LAMOTRIGINE 150 MILLIGRAM(S): 25 TABLET, ORALLY DISINTEGRATING ORAL at 17:22

## 2017-02-04 RX ADMIN — PRIMIDONE 50 MILLIGRAM(S): 250 TABLET ORAL at 17:19

## 2017-02-04 NOTE — CHART NOTE - NSCHARTNOTEFT_GEN_A_CORE
PA was called about urinary retention. The patient had a mratell and was discontinued yesterday. She voided a small amount, but hasn't voided since. RN told me they bladder scanned her and she had 600ml in her bladder. Will instruct to reinsert martell.

## 2017-02-04 NOTE — PROGRESS NOTE ADULT - SUBJECTIVE AND OBJECTIVE BOX
Patient a 55 y/o  female, single, no children, unemployed, and receiving SSI and domiciled at [facility] for 4 years, was BIB/EMS activated by residence as patient was found unresponsive on the floor by her roommate. Patient is alert, unaware of the circumstances leading to the hospitalization. Writer did call @ [phone] [facility] who informed that patient has been getting 2 weeks supply of meds and was medicating herself and they suspect that she might accidentally OD on prescribed Pills. Her meds includes, Clozapine/Seroquel/Lamictal Cogentin/Metoprolol/Aspirin/Zocor/ etc. Patient has normal speech vol/tone, endorsing that her BF was here next to her and is staying there and he was trying to get her on the phone. Ambivalent as she endorsed that she hears voices and when tried to clarify she was quiet. CT Brain/Spine is negative for CVA, labs are WNL. Spoke with her brother Kim @ [phone] who informed that his sister was much better earlier and she tried to commit suicide 4 years back with a serious SA by OD on [address] and [place] [facility] [place]. her brother also mentioned that she was on  haldol for sometime. 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 Her mood is anxious and intermittently asking for her BF fany. Her thoughts are illogical, irrelevant, has some A/h with no S/H/I/P now. She is alert, not oriented to time. Her immediate memory is OK, but was unable to repeat backwords with poor recall. She is also repetitive, sort of delusional at times.    Diagnosis: Delirium--Secondary to Unclear Etiology       Plan: On Lamictal 150 mg BID           May need Haldol 0.5 mg BID for delirium even though she has TD           C/L team to decide for Haldol           Need In-patient Psych Hospitalization for further stability           Thank You

## 2017-02-04 NOTE — PROGRESS NOTE ADULT - SUBJECTIVE AND OBJECTIVE BOX
HPI:  56F with a history of schizoaffective disorder, who lives in a group home. Brought to the ER found having fallen out of bed and being unresponsive. Brought to the ER, with stable BP, HR and comfortably breathing and protecting her airway.  Stat Head Ct was negative for cute CVA/bleed. Her initial labs and EKG were unremarkable, although her urine tox was + for barbituates. The alcohol/acetaminophen/salicylate levels were negative. There were was no fever and no menningeal signs. She was started on IV fluids and brought to the ICU for severe obtundation, with concerns for airway protection.  In reviewing her medication list, she is currently on multiple potentially sedating medications including Clozapine, Seroquel, and Lamicatal. (2017 11:26)    CC: feels bad that she is missing angelica's day, and horrified that her brother private parts were cut off. no other physical complaints, denies urinary burning but some lower abdominal pain intermittently.      INTERVAL HPI/ OVERNIGHT EVENTS: Patient is seen and examined, pt spike low grade fever 2 days ago, also noted to be tachycardiac    REVIEW OF SYSTEMS:    Denied, chills, abd. pain, nausea, vomiting, chest pain, SOB, headache, dizziness    PHYSICAL EXAM:    Vital Signs Last 24 Hrs  T(C): 37.4, Max: 37.5 (02-03 @ 10:54)  T(F): 99.4, Max: 99.5 (02-03 @ 10:54)  HR: 114 (113 - 119)  BP: 115/81 (112/78 - 134/83)  BP(mean): --  RR: 18 (18 - 18)  SpO2: 95% (95% - 95%)    GENERAL: NAD, anxious  HEENT: EOMI  Neck: supple  CHEST/LUNG: Clear to percussion bilaterally; No wheezing  HEART: S1S2+ audible  ABDOMEN: Soft, Nontender, distended; Bowel sounds present  EXTREMITIES:  no edema  CNS: AAO X 1 - self only  - place(thinks this is [place])    LABS:                                              13.9   10.86 )-----------( 178      ( 2017 06:44 )             42.3   2017 06:44    144    |  106    |  20.0   ----------------------------<  122    3.6     |  22.0   |  0.55     Ca    8.9        2017 06:44  Mg     2.0       2017 05:12          Urinalysis Basic - ( 2017 23:50 )    Color: Yellow / Appearance: Clear / S.025 / pH: x  Gluc: x / Ketone: Large  / Bili: Negative / Urobili: Negative mg/dL   Blood: x / Protein: 30 mg/dL / Nitrite: Negative   Leuk Esterase: Trace / RBC: 3-5 /HPF / WBC 6-10   Sq Epi: x / Non Sq Epi: Occasional / Bacteria: Occasional          MEDICATIONS  (STANDING):  enoxaparin Injectable 40milliGRAM(s) SubCutaneous every 24 hours  pantoprazole    Tablet 40milliGRAM(s) Oral before breakfast  primidone 50milliGRAM(s) Oral two times a day  lamoTRIgine 150milliGRAM(s) Oral two times a day    MEDICATIONS  (PRN):      RADIOLOGY & ADDITIONAL TEST   EXAM:  CT CHEST                        PROCEDURE DATE:  2017   IMPRESSION:     No lung mass or other acute findings. Incidental finding right breast   with recommendations as above.

## 2017-02-04 NOTE — PROGRESS NOTE ADULT - ASSESSMENT
56 y F hx schizophrenia, HTN, recently dx'd R breast CA s/p lumpectomy/chemo, adm for AMS, intentional drug OD.     A/P    >Encephalopathy: 2/2 ?impulsive ingestion - drug OD, MS awake, oriented, but confused  appreciate psychiatry input, 1:1 dc'd. cont to monitor. EKG 02/02/2017- qtc 476 - improved     >Schizophrenia: f/u psych recs, further management as per psych - needs inpatient psych hospitalization  pt lacks decision making capacity per psych    >Fever - blood c/s resend, no focal symptoms, will hold on Abx, f/u c/s  Ct chest no infiltrate   blood c.s 01/31/2017  UA dirty - repeat urine culture  will give one dose rocephine for possible UTI, given persistent low grade temp, Tachycardia, urinary retention and lower abd pain    >Urinary retention   failed void trial - now back with martell      >Hypokalemia - potassium supplement  f/u BMP in am     >HTN: Stable   monitor BP, cont. to hold medication for now     >Lung mass on cxr  CT chest no lung mass noted, ?  Hyperdense lesion in the right retroareolar breast with associated fat necrosis, will need outpatient f/u- mammogram     >DVT: lovenox

## 2017-02-05 LAB
ANION GAP SERPL CALC-SCNC: 9 MMOL/L — SIGNIFICANT CHANGE UP (ref 5–17)
BUN SERPL-MCNC: 22 MG/DL — HIGH (ref 8–20)
CALCIUM SERPL-MCNC: 8.7 MG/DL — SIGNIFICANT CHANGE UP (ref 8.6–10.2)
CHLORIDE SERPL-SCNC: 108 MMOL/L — HIGH (ref 98–107)
CO2 SERPL-SCNC: 25 MMOL/L — SIGNIFICANT CHANGE UP (ref 22–29)
CREAT SERPL-MCNC: 0.67 MG/DL — SIGNIFICANT CHANGE UP (ref 0.5–1.3)
CULTURE RESULTS: NO GROWTH — SIGNIFICANT CHANGE UP
CULTURE RESULTS: SIGNIFICANT CHANGE UP
CULTURE RESULTS: SIGNIFICANT CHANGE UP
GLUCOSE SERPL-MCNC: 106 MG/DL — SIGNIFICANT CHANGE UP (ref 70–115)
HCT VFR BLD CALC: 40.5 % — SIGNIFICANT CHANGE UP (ref 37–47)
HGB BLD-MCNC: 12.7 G/DL — SIGNIFICANT CHANGE UP (ref 12–16)
MCHC RBC-ENTMCNC: 29 PG — SIGNIFICANT CHANGE UP (ref 27–31)
MCHC RBC-ENTMCNC: 31.4 G/DL — LOW (ref 32–36)
MCV RBC AUTO: 92.5 FL — SIGNIFICANT CHANGE UP (ref 81–99)
PLATELET # BLD AUTO: 156 K/UL — SIGNIFICANT CHANGE UP (ref 150–400)
POTASSIUM SERPL-MCNC: 3.7 MMOL/L — SIGNIFICANT CHANGE UP (ref 3.5–5.3)
POTASSIUM SERPL-SCNC: 3.7 MMOL/L — SIGNIFICANT CHANGE UP (ref 3.5–5.3)
RBC # BLD: 4.38 M/UL — LOW (ref 4.4–5.2)
RBC # FLD: 14.8 % — SIGNIFICANT CHANGE UP (ref 11–15.6)
SODIUM SERPL-SCNC: 142 MMOL/L — SIGNIFICANT CHANGE UP (ref 135–145)
SPECIMEN SOURCE: SIGNIFICANT CHANGE UP
WBC # BLD: 7.49 K/UL — SIGNIFICANT CHANGE UP (ref 4.8–10.8)
WBC # FLD AUTO: 7.49 K/UL — SIGNIFICANT CHANGE UP (ref 4.8–10.8)

## 2017-02-05 PROCEDURE — 99231 SBSQ HOSP IP/OBS SF/LOW 25: CPT

## 2017-02-05 PROCEDURE — 93970 EXTREMITY STUDY: CPT | Mod: 26

## 2017-02-05 PROCEDURE — 99233 SBSQ HOSP IP/OBS HIGH 50: CPT

## 2017-02-05 RX ORDER — ENOXAPARIN SODIUM 100 MG/ML
113 INJECTION SUBCUTANEOUS EVERY 12 HOURS
Qty: 0 | Refills: 0 | Status: DISCONTINUED | OUTPATIENT
Start: 2017-02-05 | End: 2017-02-13

## 2017-02-05 RX ORDER — SODIUM CHLORIDE 9 MG/ML
1000 INJECTION INTRAMUSCULAR; INTRAVENOUS; SUBCUTANEOUS
Qty: 0 | Refills: 0 | Status: DISCONTINUED | OUTPATIENT
Start: 2017-02-05 | End: 2017-02-06

## 2017-02-05 RX ORDER — CEFTRIAXONE 500 MG/1
1 INJECTION, POWDER, FOR SOLUTION INTRAMUSCULAR; INTRAVENOUS EVERY 24 HOURS
Qty: 0 | Refills: 0 | Status: COMPLETED | OUTPATIENT
Start: 2017-02-05 | End: 2017-02-08

## 2017-02-05 RX ORDER — ACETAMINOPHEN 500 MG
650 TABLET ORAL ONCE
Qty: 0 | Refills: 0 | Status: COMPLETED | OUTPATIENT
Start: 2017-02-05 | End: 2017-02-05

## 2017-02-05 RX ADMIN — SODIUM CHLORIDE 100 MILLILITER(S): 9 INJECTION INTRAMUSCULAR; INTRAVENOUS; SUBCUTANEOUS at 10:42

## 2017-02-05 RX ADMIN — Medication 1 APPLICATION(S): at 17:37

## 2017-02-05 RX ADMIN — LAMOTRIGINE 150 MILLIGRAM(S): 25 TABLET, ORALLY DISINTEGRATING ORAL at 05:48

## 2017-02-05 RX ADMIN — PANTOPRAZOLE SODIUM 40 MILLIGRAM(S): 20 TABLET, DELAYED RELEASE ORAL at 05:49

## 2017-02-05 RX ADMIN — ENOXAPARIN SODIUM 40 MILLIGRAM(S): 100 INJECTION SUBCUTANEOUS at 05:49

## 2017-02-05 RX ADMIN — CEFTRIAXONE 100 GRAM(S): 500 INJECTION, POWDER, FOR SOLUTION INTRAMUSCULAR; INTRAVENOUS at 13:59

## 2017-02-05 RX ADMIN — PRIMIDONE 50 MILLIGRAM(S): 250 TABLET ORAL at 05:49

## 2017-02-05 RX ADMIN — PRIMIDONE 50 MILLIGRAM(S): 250 TABLET ORAL at 17:36

## 2017-02-05 RX ADMIN — SODIUM CHLORIDE 100 MILLILITER(S): 9 INJECTION INTRAMUSCULAR; INTRAVENOUS; SUBCUTANEOUS at 20:52

## 2017-02-05 RX ADMIN — LAMOTRIGINE 150 MILLIGRAM(S): 25 TABLET, ORALLY DISINTEGRATING ORAL at 17:37

## 2017-02-05 RX ADMIN — Medication 650 MILLIGRAM(S): at 09:00

## 2017-02-05 NOTE — CHART NOTE - NSCHARTNOTEFT_GEN_A_CORE
Subjective:  55 y/o female with history of schizophrenia admitted for a fall and AMS presents today with a fever of 100.5 orally. Other vital signs include 100.5 temp rectally, HR 98, /68, RR18 94% room air. She has been having low grade fevers since Feb 02 (99.8-100.9). Extensive work up has been done in the past including blood cultures (negative), CXR (negative), lactate (wnl), EKG, UA (weakly positive). Urine culture was sent yesterday and results are pending. When I saw the patient she was sitting upright in the chair in NAD. She stated nothing was bothering her although she feels weak. She denies CP, fevers, chills, nausea, vomiting, HA, recent vision changes, cough, runny nose, sore throat, abdominal pain, dysuria, urinary frequency.    Objective:  Vital Signs Last 24 Hrs  T(C): 38.1, Max: 38.1 (02-05 @ 07:12)  T(F): 100.5, Max: 100.5 (02-05 @ 07:12)  HR: 98 (98 - 114)  BP: 113/68 (109/77 - 129/86)  RR: 18 (17 - 18)  SpO2: 94% (94% - 94%)       GEN: NAD, pleasant   EYES: PERRL, EOMI  NECK: no lymphadenopathy   MOUTH: Pink, moist, no lesions, no erythema or tonsilar exudates  HEART: tachycardic, no MRG  LUNGS: Clear to auscultation bilateral  ABDOMEN: Soft, nontender to deep palpation, non distended  BACK: no CVA tenderness  SKIN: no Rash                          12.7   7.49  )-----------( 156      ( 05 Feb 2017 05:17 )             40.5         Assessment:   55 y/o female with history of schizophrenia admitted for a fall and AMS presents today with a fever of 100.5 orally. She has been having low grade fevers since Feb 02 at which point she was 100.9. Extensive workup has been done which so far revealed a weakly positive UA, urine culture pending sent Feb 04. She has received one dose of ceftriaxone 1g yesterday for suspected UTI    1. Fever  DDx includes UTI, drug fever, ?breast malignancy    Will order Tylenol 650mg PO once ordered    UTI- The patient is denying symptoms however she has had a martell catheter in place for days. She failed a voiding trial and the martell needed to be reinserted. She is also on Lamictal which can cause UTIs. Urine Culture sent, the patient has received once dose of ceftriaxone.    Drug fever - The patient is currently on primidone which can cause drug fever. Home medication reconciliation does not show the patient was on this medication before it was started on Fed 02. This could be the cause of the patient's fevers. She does not have a rash or skin manifestations on SJS. Patient is also on lamictal.    ?breast malignancy - Recent CT scan showed Hyperdense lesion in the right retroareolar breast with associated fat necrosis, possibly related to prior instrumentation, however recommend correlation with prior breast imaging to exclude underlying mass. This could be the cause of the patient's low grade fevers. The patient is to have an outpatient mammogram.    Case discussed with Dr. Paniagua Subjective:  57 y/o female with history of schizophrenia admitted for a fall and AMS presents today with a fever of 100.5 orally. Other vital signs include 100.5 temp rectally, HR 98, /68, RR18 94% room air. She has been having low grade fevers since Feb 02 (99.8-100.9). Extensive work up has been done in the past including blood cultures (negative), CXR (negative), lactate (wnl), EKG, UA (weakly positive). Urine culture was sent yesterday and results are pending. When I saw the patient she was sitting upright in the chair in NAD. She stated nothing was bothering her although she feels weak. She denies CP, fevers, chills, nausea, vomiting, HA, recent vision changes, cough, runny nose, sore throat, abdominal pain, dysuria, urinary frequency.    Objective:  Vital Signs Last 24 Hrs  T(C): 38.1, Max: 38.1 (02-05 @ 07:12)  T(F): 100.5, Max: 100.5 (02-05 @ 07:12)  HR: 98 (98 - 114)  BP: 113/68 (109/77 - 129/86)  RR: 18 (17 - 18)  SpO2: 94% (94% - 94%)       GEN: NAD, pleasant   EYES: PERRL, EOMI  NECK: no lymphadenopathy   MOUTH: Pink, moist, no lesions, no erythema or tonsilar exudates  HEART: tachycardic, no MRG  LUNGS: Clear to auscultation bilateral  ABDOMEN: Soft, nontender to deep palpation, non distended  BACK: no CVA tenderness  SKIN: no Rash                          12.7   7.49  )-----------( 156      ( 05 Feb 2017 05:17 )             40.5         Assessment:   57 y/o female with history of schizophrenia admitted for a fall and AMS presents today with a fever of 100.5 orally. She has been having low grade fevers since Feb 02 at which point she was 100.9. Extensive workup has been done which so far revealed a weakly positive UA, urine culture pending sent Feb 04. She has received one dose of ceftriaxone 1g yesterday for suspected UTI    1. Fever  DDx includes UTI, drug fever, ?breast malignancy    Will order Tylenol 650mg PO once ordered    UTI- The patient is denying symptoms however she has had a martell catheter in place for days. She failed a voiding trial and the martell needed to be reinserted. She is also on Lamictal which can cause UTIs. Urine Culture sent, the patient has received once dose of ceftriaxone.    Drug fever - The patient is currently on primidone which can cause drug fever. Home medication reconciliation does not show the patient was on this medication before it was started on Fed 02. This could be the cause of the patient's fevers. She does not have a rash or skin manifestations on SJS. Patient is also on lamictal.    ?breast malignancy - Recent CT scan showed Hyperdense lesion in the right retroareolar breast with associated fat necrosis, possibly related to prior instrumentation, however recommend correlation with prior breast imaging to exclude underlying mass. This could be the cause of the patient's low grade fevers. The patient is to have an outpatient mammogram.    Addendum 2/5/17 10:30:  After further review of the patient's chart she has a history of breast CA. She also has low grade fever and elevated d-dimer. Will order venous doppler of LE bilateral.    Case discussed with Dr. Paniagua Subjective:  55 y/o female with history of schizophrenia admitted for a fall and AMS presents today with a fever of 100.5 orally. Other vital signs include 100.5 temp rectally, HR 98, /68, RR18 94% room air. She has been having low grade fevers since Feb 02 (99.8-100.9). Extensive work up has been done in the past including blood cultures (negative), CXR (negative), lactate (wnl), EKG, UA (weakly positive). Urine culture was sent yesterday and results are pending. When I saw the patient she was sitting upright in the chair in NAD. She stated nothing was bothering her although she feels weak. She denies CP, fevers, chills, nausea, vomiting, HA, recent vision changes, cough, runny nose, sore throat, abdominal pain, dysuria, urinary frequency.    Objective:  Vital Signs Last 24 Hrs  T(C): 38.1, Max: 38.1 (02-05 @ 07:12)  T(F): 100.5, Max: 100.5 (02-05 @ 07:12)  HR: 98 (98 - 114)  BP: 113/68 (109/77 - 129/86)  RR: 18 (17 - 18)  SpO2: 94% (94% - 94%)       GEN: NAD, pleasant   EYES: PERRL, EOMI  NECK: no lymphadenopathy   MOUTH: Pink, moist, no lesions, no erythema or tonsilar exudates  HEART: tachycardic, no MRG  LUNGS: Clear to auscultation bilateral  ABDOMEN: Soft, nontender to deep palpation, non distended  BACK: no CVA tenderness  SKIN: no Rash                          12.7   7.49  )-----------( 156      ( 05 Feb 2017 05:17 )             40.5         Assessment:   55 y/o female with history of schizophrenia admitted for a fall and AMS presents today with a fever of 100.5 orally. She has been having low grade fevers since Feb 02 at which point she was 100.9. Extensive workup has been done which so far revealed a weakly positive UA, urine culture pending sent Feb 04. She has received one dose of ceftriaxone 1g yesterday for suspected UTI    1. Fever  DDx includes UTI, drug fever, ?breast malignancy, DVT    Will order Tylenol 650mg PO once ordered    UTI- The patient is denying symptoms however she has had a martell catheter in place for days. She failed a voiding trial and the martell needed to be reinserted. She is also on Lamictal which can cause UTIs. Urine Culture sent, the patient has received once dose of ceftriaxone.    Drug fever - The patient is currently on primidone which can cause drug fever. Home medication reconciliation does not show the patient was on this medication before it was started on Fed 02. This could be the cause of the patient's fevers. She does not have a rash or skin manifestations on SJS. Patient is also on lamictal.    ?breast malignancy - Recent CT scan showed Hyperdense lesion in the right retroareolar breast with associated fat necrosis, possibly related to prior instrumentation, however recommend correlation with prior breast imaging to exclude underlying mass. This could be the cause of the patient's low grade fevers. The patient is to have an outpatient mammogram.    Addendum 2/5/17 10:30:  After further review of the patient's chart she has a history of breast CA. She also has low grade fever and elevated d-dimer. Will order venous doppler of LE bilateral to r/o DVT.    Case discussed with Dr. Paniagua Subjective:  55 y/o female with history of schizophrenia admitted for a fall and AMS presents today with a fever of 100.5 orally. Other vital signs include 100.5 temp rectally, HR 98, /68, RR18 94% room air. She has been having low grade fevers since Feb 02 (99.8-100.9). Extensive work up has been done in the past including blood cultures (negative), CXR (negative), lactate (wnl), EKG, UA (weakly positive). Urine culture was sent yesterday and results are pending. When I saw the patient she was sitting upright in the chair in NAD. She stated nothing was bothering her although she feels weak. She denies CP, fevers, chills, nausea, vomiting, HA, recent vision changes, cough, runny nose, sore throat, abdominal pain, dysuria, urinary frequency.    Objective:  Vital Signs Last 24 Hrs  T(C): 38.1, Max: 38.1 (02-05 @ 07:12)  T(F): 100.5, Max: 100.5 (02-05 @ 07:12)  HR: 98 (98 - 114)  BP: 113/68 (109/77 - 129/86)  RR: 18 (17 - 18)  SpO2: 94% (94% - 94%)       GEN: NAD, pleasant   EYES: PERRL, EOMI  NECK: no lymphadenopathy   MOUTH: Pink, moist, no lesions, no erythema or tonsilar exudates  HEART: tachycardic, no MRG  LUNGS: Clear to auscultation bilateral  ABDOMEN: Soft, nontender to deep palpation, non distended  BACK: no CVA tenderness  SKIN: no Rash                          12.7   7.49  )-----------( 156      ( 05 Feb 2017 05:17 )             40.5         Assessment:   55 y/o female with history of schizophrenia admitted for a fall and AMS presents today with a fever of 100.5 orally. She has been having low grade fevers since Feb 02 at which point she was 100.9. Extensive workup has been done which so far revealed a weakly positive UA, urine culture pending sent Feb 04. She has received one dose of ceftriaxone 1g yesterday for suspected UTI    1. Fever  DDx includes UTI, drug fever, ?breast malignancy, DVT    Will order Tylenol 650mg PO once ordered    UTI- The patient is denying symptoms however she has had a martell catheter in place for days. She failed a voiding trial and the martell needed to be reinserted. She is also on Lamictal which can cause UTIs. Urine Culture sent, the patient has received once dose of ceftriaxone.    Drug fever - The patient is currently on primidone which can cause drug fever. Home medication reconciliation does not show the patient was on this medication before it was started on Fed 02. This could be the cause of the patient's fevers. She does not have a rash or skin manifestations on SJS. Patient is also on lamictal.    ?breast malignancy - Recent CT scan showed Hyperdense lesion in the right retroareolar breast with associated fat necrosis, possibly related to prior instrumentation, however recommend correlation with prior breast imaging to exclude underlying mass. This could be the cause of the patient's low grade fevers. The patient is to have an outpatient mammogram.    Addendum 2/5/17 10:30:  After further review of the patient's chart she has a history of breast CA. She also has low grade fever and elevated d-dimer. Will order venous doppler of LE bilateral to r/o DVT.    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 Vascular surgery was called for recommendations regarding anticoagulation. They recommend full dose anticoagulation and possible warfarin on discharge given her risk factors. Official vascular consult is called in and pending for the AM. Will fully anticoagulate the patient with lovenox 1mg/kg SC BID.    Case discussed with Dr. Paniagua

## 2017-02-05 NOTE — PROGRESS NOTE ADULT - SUBJECTIVE AND OBJECTIVE BOX
HPI:  56F with a history of schizoaffective disorder, who lives in a group home. Brought to the ER found having fallen out of bed and being unresponsive. Brought to the ER, with stable BP, HR and comfortably breathing and protecting her airway.  Stat Head Ct was negative for cute CVA/bleed. Her initial labs and EKG were unremarkable, although her urine tox was + for barbituates. The alcohol/acetaminophen/salicylate levels were negative. There were was no fever and no menningeal signs. She was started on IV fluids and brought to the ICU for severe obtundation, with concerns for airway protection.  In reviewing her medication list, she is currently on multiple potentially sedating medications including Clozapine, Seroquel, and Lamicatal. (2017 11:26)    CC: None today      INTERVAL HPI/ OVERNIGHT EVENTS: Patient is seen and examined, pt spike low grade fever again, also noted to be tachycardiac    REVIEW OF SYSTEMS:    Denied, chills, abd. pain, nausea, vomiting, chest pain, SOB, headache, dizziness    PHYSICAL EXAM:  Vital Signs Last 24 Hrs  T(C): 37.4, Max: 38.1 ( @ 07:12)  T(F): 99.3, Max: 100.5 ( @ 07:12)  HR: 105 (98 - 113)  BP: 109/76 (109/76 - 129/86)  BP(mean): --  RR: 20 (17 - 20)  SpO2: 96% (94% - 96%)      GENERAL: NAD, anxious  HEENT: EOMI  Neck: supple  CHEST/LUNG: Clear to percussion bilaterally; No wheezing  HEART: S1S2+ audible  ABDOMEN: Soft, Nontender, distended; Bowel sounds present  EXTREMITIES:  no edema  CNS: AAO X 1 - self only  - place(thinks this is pilgrim)    LABS:                                   12.7   7.49  )-----------( 156      ( 2017 05:17 )             40.5   2017 05:17    142    |  108    |  22.0   ----------------------------<  106    3.7     |  25.0   |  0.67     Ca    8.7        2017 05:17            Urinalysis Basic - ( 2017 12:03 )    Color: Yellow / Appearance: Clear / S.025 / pH: x  Gluc: x / Ketone: Large  / Bili: Moderate / Urobili: 8 mg/dL   Blood: x / Protein: 30 mg/dL / Nitrite: Negative   Leuk Esterase: Small / RBC: 11-25 /HPF / WBC 6-10   Sq Epi: x / Non Sq Epi: Occasional / Bacteria: Few        MEDICATIONS  (STANDING):  enoxaparin Injectable 40milliGRAM(s) SubCutaneous every 24 hours  pantoprazole    Tablet 40milliGRAM(s) Oral before breakfast  primidone 50milliGRAM(s) Oral two times a day  lamoTRIgine 150milliGRAM(s) Oral two times a day  cefTRIAXone   IVPB 1Gram(s) IV Intermittent every 24 hours  sodium chloride 0.9%. 1000milliLiter(s) IV Continuous <Continuous>    MEDICATIONS  (PRN):    RADIOLOGY & ADDITIONAL TEST   EXAM:  CT CHEST                        PROCEDURE DATE:  2017   IMPRESSION:     No lung mass or other acute findings. Incidental finding right breast   with recommendations as above.

## 2017-02-05 NOTE — PROGRESS NOTE ADULT - ASSESSMENT
56 y F hx schizophrenia, HTN, recently dx'd R breast CA s/p lumpectomy/chemo, adm for AMS, intentional drug OD.     A/P    >Encephalopathy: 2/2 ?impulsive ingestion - drug OD, MS awake, oriented, but confused  appreciate psychiatry input, 1:1 dc'd. cont to monitor. EKG 02/02/2017- qtc 476 - improved     >Schizophrenia: f/u psych recs, further management as per psych - needs inpatient psych hospitalization  pt lacks decision making capacity per psych    >Fever - blood c/s resend f/u c/s  Ct chest no infiltrate   blood c.s 01/31/2017  UA dirty - repeat urine culture  will ct rocephine for a  UTI, given persistent low grade temp, Tachycardia, urinary retention and lower abd pain, f/u urine culture  Get b/l LE doppler r/o DVT    >Urinary retention   failed void trial - now back with martell      >Hypokalemia - potassium supplement  f/u BMP in am     >HTN: Stable   monitor BP, cont. to hold medication for now     >Lung mass on cxr  CT chest no lung mass noted, ?  Hyperdense lesion in the right retroareolar breast with associated fat necrosis, will need outpatient f/u- mammogram     >DVT: lovenox

## 2017-02-06 DIAGNOSIS — I82.541 CHRONIC EMBOLISM AND THROMBOSIS OF RIGHT TIBIAL VEIN: ICD-10-CM

## 2017-02-06 PROCEDURE — 99232 SBSQ HOSP IP/OBS MODERATE 35: CPT

## 2017-02-06 PROCEDURE — 93010 ELECTROCARDIOGRAM REPORT: CPT

## 2017-02-06 PROCEDURE — 99233 SBSQ HOSP IP/OBS HIGH 50: CPT

## 2017-02-06 RX ORDER — SENNA PLUS 8.6 MG/1
2 TABLET ORAL AT BEDTIME
Qty: 0 | Refills: 0 | Status: DISCONTINUED | OUTPATIENT
Start: 2017-02-06 | End: 2017-02-13

## 2017-02-06 RX ORDER — POLYETHYLENE GLYCOL 3350 17 G/17G
17 POWDER, FOR SOLUTION ORAL DAILY
Qty: 0 | Refills: 0 | Status: DISCONTINUED | OUTPATIENT
Start: 2017-02-06 | End: 2017-02-13

## 2017-02-06 RX ORDER — DOCUSATE SODIUM 100 MG
100 CAPSULE ORAL
Qty: 0 | Refills: 0 | Status: DISCONTINUED | OUTPATIENT
Start: 2017-02-06 | End: 2017-02-13

## 2017-02-06 RX ADMIN — ENOXAPARIN SODIUM 113 MILLIGRAM(S): 100 INJECTION SUBCUTANEOUS at 18:41

## 2017-02-06 RX ADMIN — POLYETHYLENE GLYCOL 3350 17 GRAM(S): 17 POWDER, FOR SOLUTION ORAL at 11:42

## 2017-02-06 RX ADMIN — LAMOTRIGINE 150 MILLIGRAM(S): 25 TABLET, ORALLY DISINTEGRATING ORAL at 18:42

## 2017-02-06 RX ADMIN — SENNA PLUS 2 TABLET(S): 8.6 TABLET ORAL at 22:33

## 2017-02-06 RX ADMIN — Medication 1 APPLICATION(S): at 11:42

## 2017-02-06 RX ADMIN — ENOXAPARIN SODIUM 113 MILLIGRAM(S): 100 INJECTION SUBCUTANEOUS at 05:25

## 2017-02-06 RX ADMIN — LAMOTRIGINE 150 MILLIGRAM(S): 25 TABLET, ORALLY DISINTEGRATING ORAL at 05:26

## 2017-02-06 RX ADMIN — PRIMIDONE 50 MILLIGRAM(S): 250 TABLET ORAL at 18:42

## 2017-02-06 RX ADMIN — Medication 100 MILLIGRAM(S): at 18:42

## 2017-02-06 RX ADMIN — PANTOPRAZOLE SODIUM 40 MILLIGRAM(S): 20 TABLET, DELAYED RELEASE ORAL at 05:26

## 2017-02-06 RX ADMIN — SODIUM CHLORIDE 100 MILLILITER(S): 9 INJECTION INTRAMUSCULAR; INTRAVENOUS; SUBCUTANEOUS at 05:24

## 2017-02-06 RX ADMIN — PRIMIDONE 50 MILLIGRAM(S): 250 TABLET ORAL at 05:26

## 2017-02-06 RX ADMIN — CEFTRIAXONE 100 GRAM(S): 500 INJECTION, POWDER, FOR SOLUTION INTRAMUSCULAR; INTRAVENOUS at 13:51

## 2017-02-06 NOTE — CONSULT NOTE ADULT - PROBLEM SELECTOR RECOMMENDATION 9
full dose AC not needed for thrombus of chronic nature however due to patients significant risk factors would recommend treatment for acute clot formation with AC

## 2017-02-06 NOTE — PROGRESS NOTE ADULT - SUBJECTIVE AND OBJECTIVE BOX
HPI:  56F with a history of schizoaffective disorder, who lives in a group home. Brought to the ER found having fallen out of bed and being unresponsive. Brought to the ER, with stable BP, HR and comfortably breathing and protecting her airway.  Stat Head Ct was negative for acute CVA/bleed. Her initial labs and EKG were unremarkable, although her urine tox was + for barbituates. The alcohol/acetaminophen/salicylate levels were negative. There were was no fever and no menningeal signs. She was started on IV fluids and brought to the ICU for severe obtundation, with concerns for airway protection.  In reviewing her medication list, she is currently on multiple potentially sedating medications including Clozapine, Seroquel, and Lamicatal. (2017 11:26)    CC: c/o low back ache and b/l knee pain due to position, wants to sit in chair.       INTERVAL HPI/ OVERNIGHT EVENTS: Patient is seen and examined, pt spike low grade temp    REVIEW OF SYSTEMS:    Denied, chills, abd. pain, nausea, vomiting, chest pain, SOB, headache, dizziness    PHYSICAL EXAM:  Vital Signs Last 24 Hrs  T(C): 37.2, Max: 37.6 ( @ 20:58)  T(F): 99, Max: 99.7 ( @ 20:58)  HR: 82 (82 - 91)  BP: 133/83 (111/81 - 133/83)  BP(mean): --  RR: 18 (18 - 20)  SpO2: 95% (95% - 97%)      GENERAL: NAD, appears comfortable, awake, alert  HEENT: EOMI  Neck: supple  CHEST/LUNG: Clear to percussion bilaterally; No wheezing  HEART: S1S2+ audible  ABDOMEN: Soft, Nontender, distended; Bowel sounds present  EXTREMITIES:  no edema  CNS: AAO X 3      LABS:                                              12.7   7.49  )-----------( 156      ( 2017 05:17 )             40.5   2017 05:17    142    |  108    |  22.0   ----------------------------<  106    3.7     |  25.0   |  0.67     Ca    8.7        2017 05:17      Urinalysis Basic - ( 2017 12:03 )    Color: Yellow / Appearance: Clear / S.025 / pH: x  Gluc: x / Ketone: Large  / Bili: Moderate / Urobili: 8 mg/dL   Blood: x / Protein: 30 mg/dL / Nitrite: Negative   Leuk Esterase: Small / RBC: 11-25 /HPF / WBC 6-10   Sq Epi: x / Non Sq Epi: Occasional / Bacteria: Few          MEDICATIONS  (STANDING):  pantoprazole    Tablet 40milliGRAM(s) Oral before breakfast  primidone 50milliGRAM(s) Oral two times a day  lamoTRIgine 150milliGRAM(s) Oral two times a day  cefTRIAXone   IVPB 1Gram(s) IV Intermittent every 24 hours  silver sulfADIAZINE 1% Cream 1Application(s) Topical daily  enoxaparin Injectable 113milliGRAM(s) SubCutaneous every 12 hours  docusate sodium 100milliGRAM(s) Oral two times a day  senna 2Tablet(s) Oral at bedtime  polyethylene glycol 3350 17Gram(s) Oral daily    MEDICATIONS  (PRN):      RADIOLOGY & ADDITIONAL TEST   EXAM:  CT CHEST                        PROCEDURE DATE:  2017   IMPRESSION:     No lung mass or other acute findings. Incidental finding right breast   with recommendations as above.

## 2017-02-06 NOTE — PROGRESS NOTE ADULT - SUBJECTIVE AND OBJECTIVE BOX
await medical clearance for transfer to inpatient psych unit Dr Siva ARROYO and SW aware see legals and written note in chart

## 2017-02-06 NOTE — DIETITIAN INITIAL EVALUATION ADULT. - OTHER INFO
BMI 34.9, [facility] weight today 210#, Pt reports 12# intentional weight loss. Eating well now

## 2017-02-06 NOTE — CONSULT NOTE ADULT - ASSESSMENT
56F with Focal chronic right tibial peroneal trunk thrombus, no acute DVT. Patient extremely high risk for development of acute thrombus due to history.     Would consider full dose AC to prevent acute clot formation   consider coumadin as outpatient if not contraindication   no acute vascular intervention   may follow up outpatient, please re consult a sneeded

## 2017-02-06 NOTE — PROGRESS NOTE ADULT - ASSESSMENT
56 y F hx schizophrenia, HTN, recently dx'd R breast CA s/p lumpectomy/chemo, adm for AMS, intentional drug OD.     A/P    >Encephalopathy: 2/2 impulsive ingestion - drug OD, MS awake, oriented,. appropriate today.  appreciate psychiatry input, 1:1 dc'd. cont to monitor. EKG 02/02/2017- qtc 476 - improved     >Schizophrenia: f/u psych recs, further management as per psych - needs inpatient psych hospitalization  pt lacks decision making capacity per psych    >Fever - blood c/s _ NG  Ct chest no infiltrate   blood c.s 01/31/2017  UA dirty - repeat urine culture - NG  will ct rocephine for a  UTI, given persistent low grade temp, Tachycardia, urinary retention and lower abd pain, f/u urine culture   b/l LE doppler r/o DVT - s/o chronic DVT - per Vasc sx - start AC given her high risk. Will need final recs reg length of AC therapy    Chronic DVT - Await final vasc recs     >Urinary retention   failed void trial - now back with martell  suspect multifactorial - UTI+constipation+immobility    >Hypokalemia - potassium supplement  f/u BMP in am     >HTN: Stable   monitor BP, cont. to hold medication for now     >Lung mass on cxr  CT chest no lung mass noted, ? Hyperdense lesion in the right retroareolar breast with associated fat necrosis, will need outpatient f/u- mammogram     >DVT: lovenox

## 2017-02-06 NOTE — CONSULT NOTE ADULT - SUBJECTIVE AND OBJECTIVE BOX
56F with a history of schizoaffective disorder, who lives in a group home h/o breast ca. Brought to the ER found having fallen out of bed and being unresponsive. Admitted to medicine.  Stat Head Ct was negative for acute CVA/bleed. Patient currently on multipel psych meds.  Recent low grade temps prompting medicine to initiate work up for DVT. Bilat LE duplex  + for  Focal chronic right tibial peroneal trunk thrombus. Patient denies having h/o blood clots. Denies outpatient AC use. Denies leg pain or recent significant leg swelling.   Vitals   T(C): 37.2, Max: 37.6 (02-05 @ 20:58)  T(F): 99, Max: 99.7 (02-05 @ 20:58)  HR: 82 (82 - 91)  BP: 133/83 (111/81 - 133/83)  BP(mean): --  ABP: --  ABP(mean): --  RR: 18 (18 - 20)  SpO2: 95% (95% - 97%)    MEDICATIONS  (STANDING):  pantoprazole    Tablet 40milliGRAM(s) Oral before breakfast  primidone 50milliGRAM(s) Oral two times a day  lamoTRIgine 150milliGRAM(s) Oral two times a day  cefTRIAXone   IVPB 1Gram(s) IV Intermittent every 24 hours  silver sulfADIAZINE 1% Cream 1Application(s) Topical daily  enoxaparin Injectable 113milliGRAM(s) SubCutaneous every 12 hours  docusate sodium 100milliGRAM(s) Oral two times a day  senna 2Tablet(s) Oral at bedtime  polyethylene glycol 3350 17Gram(s) Oral daily    PHYSICAL EXAM:  Gen: alert awake,  disinterested affect   Extremities: RLE calf soft supple NT , no significant swelling, NVI at baseline    LLE exam unremarkable vascular exam     LABS:  CBC Full  -  ( 05 Feb 2017 05:17 )  WBC Count : 7.49 K/uL  Hemoglobin : 12.7 g/dL  Hematocrit : 40.5 %  Platelet Count - Automated : 156 K/uL    05 Feb 2017 05:17    142    |  108    |  22.0   ----------------------------<  106    3.7     |  25.0   |  0.67     Ca    8.7        05 Feb 2017 05:17        Duplex: No evidence for acute DVT in the bilateral lower extremity deep venous  systems. Focal chronic right tibial peroneal trunk thrombus.

## 2017-02-07 PROCEDURE — 99233 SBSQ HOSP IP/OBS HIGH 50: CPT

## 2017-02-07 PROCEDURE — 99232 SBSQ HOSP IP/OBS MODERATE 35: CPT

## 2017-02-07 RX ORDER — QUETIAPINE FUMARATE 200 MG/1
50 TABLET, FILM COATED ORAL
Qty: 0 | Refills: 0 | Status: DISCONTINUED | OUTPATIENT
Start: 2017-02-07 | End: 2017-02-13

## 2017-02-07 RX ORDER — ACETAMINOPHEN 500 MG
650 TABLET ORAL EVERY 6 HOURS
Qty: 0 | Refills: 0 | Status: DISCONTINUED | OUTPATIENT
Start: 2017-02-07 | End: 2017-02-13

## 2017-02-07 RX ORDER — WARFARIN SODIUM 2.5 MG/1
5 TABLET ORAL ONCE
Qty: 0 | Refills: 0 | Status: COMPLETED | OUTPATIENT
Start: 2017-02-07 | End: 2017-02-07

## 2017-02-07 RX ADMIN — SENNA PLUS 2 TABLET(S): 8.6 TABLET ORAL at 23:20

## 2017-02-07 RX ADMIN — Medication 100 MILLIGRAM(S): at 06:25

## 2017-02-07 RX ADMIN — Medication 100 MILLIGRAM(S): at 18:04

## 2017-02-07 RX ADMIN — Medication 1 ENEMA: at 12:35

## 2017-02-07 RX ADMIN — WARFARIN SODIUM 5 MILLIGRAM(S): 2.5 TABLET ORAL at 23:19

## 2017-02-07 RX ADMIN — PANTOPRAZOLE SODIUM 40 MILLIGRAM(S): 20 TABLET, DELAYED RELEASE ORAL at 06:27

## 2017-02-07 RX ADMIN — ENOXAPARIN SODIUM 113 MILLIGRAM(S): 100 INJECTION SUBCUTANEOUS at 18:04

## 2017-02-07 RX ADMIN — POLYETHYLENE GLYCOL 3350 17 GRAM(S): 17 POWDER, FOR SOLUTION ORAL at 12:23

## 2017-02-07 RX ADMIN — Medication 1 ENEMA: at 23:21

## 2017-02-07 RX ADMIN — Medication 650 MILLIGRAM(S): at 18:16

## 2017-02-07 RX ADMIN — ENOXAPARIN SODIUM 113 MILLIGRAM(S): 100 INJECTION SUBCUTANEOUS at 06:26

## 2017-02-07 RX ADMIN — PRIMIDONE 50 MILLIGRAM(S): 250 TABLET ORAL at 18:05

## 2017-02-07 RX ADMIN — QUETIAPINE FUMARATE 50 MILLIGRAM(S): 200 TABLET, FILM COATED ORAL at 18:42

## 2017-02-07 RX ADMIN — CEFTRIAXONE 100 GRAM(S): 500 INJECTION, POWDER, FOR SOLUTION INTRAMUSCULAR; INTRAVENOUS at 14:09

## 2017-02-07 RX ADMIN — Medication 1 APPLICATION(S): at 12:23

## 2017-02-07 RX ADMIN — LAMOTRIGINE 150 MILLIGRAM(S): 25 TABLET, ORALLY DISINTEGRATING ORAL at 06:27

## 2017-02-07 RX ADMIN — LAMOTRIGINE 150 MILLIGRAM(S): 25 TABLET, ORALLY DISINTEGRATING ORAL at 18:05

## 2017-02-07 RX ADMIN — PRIMIDONE 50 MILLIGRAM(S): 250 TABLET ORAL at 06:27

## 2017-02-07 NOTE — PROGRESS NOTE ADULT - ASSESSMENT
56 y F hx schizophrenia, HTN, recently dx'd R breast CA s/p lumpectomy/chemo, adm for AMS, intentional drug OD.     A/P    >Encephalopathy: 2/2 impulsive ingestion - drug OD, MS awake, oriented,. appropriate today.  appreciate psychiatry input, 1:1 dc'd. cont to monitor. EKG 02/02/2017- qtc 476 - improved     >Schizophrenia: f/u psych recs, further management as per psych - needs inpatient psych hospitalization  pt lacks decision making capacity per psych    >Fever - blood c/s _ NG  Ct chest no infiltrate   blood c.s 01/31/2017  UA dirty - repeat urine culture - NG  will ct rocephine for a  UTI, given persistent low grade temp, Tachycardia, urinary retention and lower abd pain, f/u urine culture   b/l LE doppler r/o DVT - s/o chronic DVT - per Vasc sx - start AC given her high risk. Will need final recs reg length of AC therapy    Chronic DVT - start AC given risk factor - immobility and spontaneous DVT  Discussed with brother Kim over phone - All risks and benefits oF AC discussed in details - agrees with AC at this time given benefits outweigh risks.    >Urinary retention   failed void trial - now back with martell  suspect multifactorial - UTI+constipation+immobility  Enema stat - DC martell after good BM - trial of void today     >Hypokalemia - potassium supplement  f/u BMP in am     >HTN: Stable   monitor BP, cont. to hold medication for now     >Lung mass on cxr  CT chest no lung mass noted, ? Hyperdense lesion in the right retroareolar breast with associated fat necrosis, will need outpatient f/u- mammogram     >DVT: lovenox

## 2017-02-07 NOTE — PROGRESS NOTE ADULT - SUBJECTIVE AND OBJECTIVE BOX
HPI:  56F with a history of schizoaffective disorder, who lives in a group home. Brought to the ER found having fallen out of bed and being unresponsive. Brought to the ER, with stable BP, HR and comfortably breathing and protecting her airway.  Stat Head Ct was negative for acute CVA/bleed. Her initial labs and EKG were unremarkable, although her urine tox was + for barbituates. The alcohol/acetaminophen/salicylate levels were negative. There were was no fever and no menningeal signs. She was started on IV fluids and brought to the ICU for severe obtundation, with concerns for airway protection.  In reviewing her medication list, she is currently on multiple potentially sedating medications including Clozapine, Seroquel, and Lamicatal. (2017 11:26)    CC: c/o Pain at the skin tear site on her Lt Buttock      INTERVAL HPI/ OVERNIGHT EVENTS: Patient is seen and examined, low grade temp    REVIEW OF SYSTEMS:  c/o constipation    Denied, chills, abd. pain, nausea, vomiting, chest pain, SOB, headache, dizziness    PHYSICAL EXAM:  Vital Signs Last 24 Hrs  T(C): 37.4, Max: 37.4 (- @ 01:03)  T(F): 99.4, Max: 99.4 (- @ 01:03)  HR: 90 (88 - 90)  BP: 133/79 (116/77 - 133/79)  BP(mean): --  RR: 20 (20 - 20)  SpO2: 96% (96% - 96%)    GENERAL: NAD, appears comfortable, awake, alert  HEENT: EOMI  Neck: supple  CHEST/LUNG: Clear to percussion bilaterally; No wheezing  HEART: S1S2+ audible  ABDOMEN: Soft, Nontender, distended; Bowel sounds present  EXTREMITIES:  no edema  CNS: AAO X 3      LABS:                                              12.7   7.49  )-----------( 156      ( 2017 05:17 )             40.5   2017 05:17    142    |  108    |  22.0   ----------------------------<  106    3.7     |  25.0   |  0.67     Ca    8.7        2017 05:17      Urinalysis Basic - ( 2017 12:03 )    Color: Yellow / Appearance: Clear / S.025 / pH: x  Gluc: x / Ketone: Large  / Bili: Moderate / Urobili: 8 mg/dL   Blood: x / Protein: 30 mg/dL / Nitrite: Negative   Leuk Esterase: Small / RBC: 11-25 /HPF / WBC 6-10   Sq Epi: x / Non Sq Epi: Occasional / Bacteria: Few          MEDICATIONS  (STANDING):  pantoprazole    Tablet 40milliGRAM(s) Oral before breakfast  primidone 50milliGRAM(s) Oral two times a day  lamoTRIgine 150milliGRAM(s) Oral two times a day  cefTRIAXone   IVPB 1Gram(s) IV Intermittent every 24 hours  silver sulfADIAZINE 1% Cream 1Application(s) Topical daily  enoxaparin Injectable 113milliGRAM(s) SubCutaneous every 12 hours  docusate sodium 100milliGRAM(s) Oral two times a day  senna 2Tablet(s) Oral at bedtime  polyethylene glycol 3350 17Gram(s) Oral daily  sodium biphosphate Rectal Enema 1Enema Rectal once  warfarin 5milliGRAM(s) Oral once    MEDICATIONS  (PRN):        RADIOLOGY & ADDITIONAL TEST   EXAM:  CT CHEST                        PROCEDURE DATE:  2017   IMPRESSION:     No lung mass or other acute findings. Incidental finding right breast   with recommendations as above.

## 2017-02-08 LAB
ANION GAP SERPL CALC-SCNC: 11 MMOL/L — SIGNIFICANT CHANGE UP (ref 5–17)
BUN SERPL-MCNC: 8 MG/DL — SIGNIFICANT CHANGE UP (ref 8–20)
CALCIUM SERPL-MCNC: 8.5 MG/DL — LOW (ref 8.6–10.2)
CHLORIDE SERPL-SCNC: 100 MMOL/L — SIGNIFICANT CHANGE UP (ref 98–107)
CO2 SERPL-SCNC: 33 MMOL/L — HIGH (ref 22–29)
CREAT SERPL-MCNC: 0.44 MG/DL — LOW (ref 0.5–1.3)
CULTURE RESULTS: SIGNIFICANT CHANGE UP
CULTURE RESULTS: SIGNIFICANT CHANGE UP
GLUCOSE SERPL-MCNC: 105 MG/DL — SIGNIFICANT CHANGE UP (ref 70–115)
HCT VFR BLD CALC: 33.9 % — LOW (ref 37–47)
HGB BLD-MCNC: 11.1 G/DL — LOW (ref 12–16)
INR BLD: 1.22 RATIO — HIGH (ref 0.88–1.16)
MCHC RBC-ENTMCNC: 29.8 PG — SIGNIFICANT CHANGE UP (ref 27–31)
MCHC RBC-ENTMCNC: 32.7 G/DL — SIGNIFICANT CHANGE UP (ref 32–36)
MCV RBC AUTO: 91.1 FL — SIGNIFICANT CHANGE UP (ref 81–99)
PLATELET # BLD AUTO: 175 K/UL — SIGNIFICANT CHANGE UP (ref 150–400)
POTASSIUM SERPL-MCNC: 2.9 MMOL/L — CRITICAL LOW (ref 3.5–5.3)
POTASSIUM SERPL-SCNC: 2.9 MMOL/L — CRITICAL LOW (ref 3.5–5.3)
PROCALCITONIN SERPL-MCNC: <0.23 NG/ML — SIGNIFICANT CHANGE UP (ref 0–0.5)
PROTHROM AB SERPL-ACNC: 13.5 SEC — HIGH (ref 10–13.1)
RBC # BLD: 3.72 M/UL — LOW (ref 4.4–5.2)
RBC # FLD: 14.7 % — SIGNIFICANT CHANGE UP (ref 11–15.6)
SODIUM SERPL-SCNC: 144 MMOL/L — SIGNIFICANT CHANGE UP (ref 135–145)
SPECIMEN SOURCE: SIGNIFICANT CHANGE UP
SPECIMEN SOURCE: SIGNIFICANT CHANGE UP
WBC # BLD: 3.69 K/UL — LOW (ref 4.8–10.8)
WBC # FLD AUTO: 3.69 K/UL — LOW (ref 4.8–10.8)

## 2017-02-08 PROCEDURE — 99232 SBSQ HOSP IP/OBS MODERATE 35: CPT

## 2017-02-08 PROCEDURE — 99233 SBSQ HOSP IP/OBS HIGH 50: CPT

## 2017-02-08 RX ORDER — WARFARIN SODIUM 2.5 MG/1
5 TABLET ORAL ONCE
Qty: 0 | Refills: 0 | Status: COMPLETED | OUTPATIENT
Start: 2017-02-08 | End: 2017-02-08

## 2017-02-08 RX ORDER — ONDANSETRON 8 MG/1
4 TABLET, FILM COATED ORAL ONCE
Qty: 0 | Refills: 0 | Status: DISCONTINUED | OUTPATIENT
Start: 2017-02-08 | End: 2017-02-13

## 2017-02-08 RX ORDER — POTASSIUM CHLORIDE 20 MEQ
40 PACKET (EA) ORAL EVERY 4 HOURS
Qty: 0 | Refills: 0 | Status: COMPLETED | OUTPATIENT
Start: 2017-02-08 | End: 2017-02-08

## 2017-02-08 RX ADMIN — PANTOPRAZOLE SODIUM 40 MILLIGRAM(S): 20 TABLET, DELAYED RELEASE ORAL at 06:06

## 2017-02-08 RX ADMIN — PRIMIDONE 50 MILLIGRAM(S): 250 TABLET ORAL at 06:03

## 2017-02-08 RX ADMIN — CEFTRIAXONE 100 GRAM(S): 500 INJECTION, POWDER, FOR SOLUTION INTRAMUSCULAR; INTRAVENOUS at 13:40

## 2017-02-08 RX ADMIN — Medication 100 MILLIGRAM(S): at 19:09

## 2017-02-08 RX ADMIN — LAMOTRIGINE 150 MILLIGRAM(S): 25 TABLET, ORALLY DISINTEGRATING ORAL at 06:02

## 2017-02-08 RX ADMIN — LAMOTRIGINE 150 MILLIGRAM(S): 25 TABLET, ORALLY DISINTEGRATING ORAL at 19:08

## 2017-02-08 RX ADMIN — ENOXAPARIN SODIUM 113 MILLIGRAM(S): 100 INJECTION SUBCUTANEOUS at 19:02

## 2017-02-08 RX ADMIN — POLYETHYLENE GLYCOL 3350 17 GRAM(S): 17 POWDER, FOR SOLUTION ORAL at 11:40

## 2017-02-08 RX ADMIN — WARFARIN SODIUM 5 MILLIGRAM(S): 2.5 TABLET ORAL at 22:37

## 2017-02-08 RX ADMIN — ENOXAPARIN SODIUM 113 MILLIGRAM(S): 100 INJECTION SUBCUTANEOUS at 06:03

## 2017-02-08 RX ADMIN — PRIMIDONE 50 MILLIGRAM(S): 250 TABLET ORAL at 19:09

## 2017-02-08 RX ADMIN — Medication 40 MILLIEQUIVALENT(S): at 10:54

## 2017-02-08 RX ADMIN — QUETIAPINE FUMARATE 50 MILLIGRAM(S): 200 TABLET, FILM COATED ORAL at 06:03

## 2017-02-08 RX ADMIN — QUETIAPINE FUMARATE 50 MILLIGRAM(S): 200 TABLET, FILM COATED ORAL at 19:08

## 2017-02-08 RX ADMIN — Medication 40 MILLIEQUIVALENT(S): at 13:40

## 2017-02-08 RX ADMIN — Medication 1 APPLICATION(S): at 11:39

## 2017-02-08 RX ADMIN — SENNA PLUS 2 TABLET(S): 8.6 TABLET ORAL at 22:37

## 2017-02-08 RX ADMIN — Medication 100 MILLIGRAM(S): at 05:58

## 2017-02-08 NOTE — PROGRESS NOTE ADULT - SUBJECTIVE AND OBJECTIVE BOX
HPI:  56F with a history of schizoaffective disorder, who lives in a group home. Brought to the ER found having fallen out of bed and being unresponsive. Brought to the ER, with stable BP, HR and comfortably breathing and protecting her airway.  Stat Head Ct was negative for acute CVA/bleed. Her initial labs and EKG were unremarkable, although her urine tox was + for barbituates. The alcohol/acetaminophen/salicylate levels were negative. There were was no fever and no menningeal signs. She was started on IV fluids and brought to the ICU for severe obtundation, with concerns for airway protection.  In reviewing her medication list, she is currently on multiple potentially sedating medications including Clozapine, Seroquel, and Lamicatal. (2017 11:26)    CC: c/o constipation      INTERVAL HPI/ OVERNIGHT EVENTS: Patient is seen and examined, fever last night 100.5F    REVIEW OF SYSTEMS:  c/o constipation    Denied, chills, abd. pain, nausea, vomiting, chest pain, SOB, headache, dizziness    PHYSICAL EXAM:  Vital Signs Last 24 Hrs  T(C): 37.3, Max: 38.1 ( @ 16:30)  T(F): 99.2, Max: 100.5 ( @ 16:30)  HR: 86 (86 - 86)  BP: 143/97 (114/57 - 143/97)  BP(mean): --  RR: 18 (18 - 20)  SpO2: --    GENERAL: NAD, appears comfortable, awake, alert  HEENT: EOMI  Neck: supple  CHEST/LUNG: Clear to percussion bilaterally; No wheezing  HEART: S1S2+ audible  ABDOMEN: Soft, Nontender, distended; Bowel sounds present  EXTREMITIES:  no edema  CNS: AAO X 3      LABS:                                              12.7   7.49  )-----------( 156      ( 2017 05:17 )             40.5   2017 05:17    142    |  108    |  22.0   ----------------------------<  106    3.7     |  25.0   |  0.67     Ca    8.7        2017 05:17      Urinalysis Basic - ( 2017 12:03 )    Color: Yellow / Appearance: Clear / S.025 / pH: x  Gluc: x / Ketone: Large  / Bili: Moderate / Urobili: 8 mg/dL   Blood: x / Protein: 30 mg/dL / Nitrite: Negative   Leuk Esterase: Small / RBC: 11-25 /HPF / WBC 6-10   Sq Epi: x / Non Sq Epi: Occasional / Bacteria: Few      MEDICATIONS  (STANDING):  pantoprazole    Tablet 40milliGRAM(s) Oral before breakfast  primidone 50milliGRAM(s) Oral two times a day  lamoTRIgine 150milliGRAM(s) Oral two times a day  silver sulfADIAZINE 1% Cream 1Application(s) Topical daily  enoxaparin Injectable 113milliGRAM(s) SubCutaneous every 12 hours  docusate sodium 100milliGRAM(s) Oral two times a day  senna 2Tablet(s) Oral at bedtime  polyethylene glycol 3350 17Gram(s) Oral daily  QUEtiapine 50milliGRAM(s) Oral two times a day  warfarin 5milliGRAM(s) Oral once    MEDICATIONS  (PRN):  acetaminophen   Tablet 650milliGRAM(s) Oral every 6 hours PRN For Temp greater than 38 C (100.4 F)          RADIOLOGY & ADDITIONAL TEST   EXAM:  CT CHEST                        PROCEDURE DATE:  2017   IMPRESSION:     No lung mass or other acute findings. Incidental finding right breast   with recommendations as above.

## 2017-02-08 NOTE — PROGRESS NOTE ADULT - ASSESSMENT
56 y F hx schizophrenia, HTN, recently dx'd R breast CA s/p lumpectomy/chemo, adm for AMS, intentional drug OD.     A/P    >Encephalopathy: 2/2 impulsive ingestion - drug OD - resolved  appreciate psychiatry input, 1:1 dc'd. cont to monitor. EKG 02/02/2017- qtc 476 - improved     >Schizophrenia: f/u psych recs, further management as per psych - needs inpatient psych hospitalization  pt lacks decision making capacity per psych    >Fever - blood c/s _ NG  Ct chest no infiltrate   blood c.s 01/31/2017  UA dirty - repeat urine culture - NG  will ct rocephine for a  UTI complete 7days, given persistent low grade temp, Tachycardia, urinary retention and lower abd pain,  urine culture - NG    b/l LE doppler r/o DVT - s/o chronic DVT - per Vasc sx - ct AC given her high risk.     Chronic DVT - start AC given risk factor - immobility and spontaneous DVT  Discussed with brother Kim over phone - All risks and benefits oF AC discussed in details - agrees with AC at this time given benefits outweigh risks.    >Urinary retention   failed void trial - now back with martell  suspect multifactorial - UTI+constipation+immobility  Enema stat - DC martell after good BM - trial of void today     >Hypokalemia - potassium supplement  f/u BMP in am     >HTN: Stable   monitor BP, cont. to hold medication for now     >Lung mass on cxr  CT chest no lung mass noted, ? Hyperdense lesion in the right retroareolar breast with associated fat necrosis, will need outpatient f/u- mammogram     >DVT: lovenox

## 2017-02-09 LAB
ANION GAP SERPL CALC-SCNC: 10 MMOL/L — SIGNIFICANT CHANGE UP (ref 5–17)
BUN SERPL-MCNC: 7 MG/DL — LOW (ref 8–20)
CALCIUM SERPL-MCNC: 8.3 MG/DL — LOW (ref 8.6–10.2)
CHLORIDE SERPL-SCNC: 101 MMOL/L — SIGNIFICANT CHANGE UP (ref 98–107)
CO2 SERPL-SCNC: 31 MMOL/L — HIGH (ref 22–29)
CREAT SERPL-MCNC: 0.46 MG/DL — LOW (ref 0.5–1.3)
GLUCOSE SERPL-MCNC: 111 MG/DL — SIGNIFICANT CHANGE UP (ref 70–115)
HCT VFR BLD CALC: 33.5 % — LOW (ref 37–47)
HGB BLD-MCNC: 10.7 G/DL — LOW (ref 12–16)
INR BLD: 1.22 RATIO — HIGH (ref 0.88–1.16)
MCHC RBC-ENTMCNC: 29.4 PG — SIGNIFICANT CHANGE UP (ref 27–31)
MCHC RBC-ENTMCNC: 31.9 G/DL — LOW (ref 32–36)
MCV RBC AUTO: 92 FL — SIGNIFICANT CHANGE UP (ref 81–99)
PLATELET # BLD AUTO: 192 K/UL — SIGNIFICANT CHANGE UP (ref 150–400)
POTASSIUM SERPL-MCNC: 4 MMOL/L — SIGNIFICANT CHANGE UP (ref 3.5–5.3)
POTASSIUM SERPL-SCNC: 4 MMOL/L — SIGNIFICANT CHANGE UP (ref 3.5–5.3)
PROTHROM AB SERPL-ACNC: 13.4 SEC — HIGH (ref 10–13.1)
RBC # BLD: 3.64 M/UL — LOW (ref 4.4–5.2)
RBC # FLD: 14.5 % — SIGNIFICANT CHANGE UP (ref 11–15.6)
SODIUM SERPL-SCNC: 142 MMOL/L — SIGNIFICANT CHANGE UP (ref 135–145)
WBC # BLD: 3.83 K/UL — LOW (ref 4.8–10.8)
WBC # FLD AUTO: 3.83 K/UL — LOW (ref 4.8–10.8)

## 2017-02-09 PROCEDURE — 99233 SBSQ HOSP IP/OBS HIGH 50: CPT

## 2017-02-09 RX ORDER — WARFARIN SODIUM 2.5 MG/1
5 TABLET ORAL ONCE
Qty: 0 | Refills: 0 | Status: COMPLETED | OUTPATIENT
Start: 2017-02-09 | End: 2017-02-09

## 2017-02-09 RX ADMIN — WARFARIN SODIUM 5 MILLIGRAM(S): 2.5 TABLET ORAL at 22:27

## 2017-02-09 RX ADMIN — Medication 650 MILLIGRAM(S): at 15:21

## 2017-02-09 RX ADMIN — LAMOTRIGINE 150 MILLIGRAM(S): 25 TABLET, ORALLY DISINTEGRATING ORAL at 17:19

## 2017-02-09 RX ADMIN — SENNA PLUS 2 TABLET(S): 8.6 TABLET ORAL at 22:27

## 2017-02-09 RX ADMIN — Medication 100 MILLIGRAM(S): at 17:19

## 2017-02-09 RX ADMIN — POLYETHYLENE GLYCOL 3350 17 GRAM(S): 17 POWDER, FOR SOLUTION ORAL at 11:09

## 2017-02-09 RX ADMIN — PRIMIDONE 50 MILLIGRAM(S): 250 TABLET ORAL at 05:38

## 2017-02-09 RX ADMIN — LAMOTRIGINE 150 MILLIGRAM(S): 25 TABLET, ORALLY DISINTEGRATING ORAL at 05:39

## 2017-02-09 RX ADMIN — Medication 100 MILLIGRAM(S): at 05:38

## 2017-02-09 RX ADMIN — ENOXAPARIN SODIUM 113 MILLIGRAM(S): 100 INJECTION SUBCUTANEOUS at 17:19

## 2017-02-09 RX ADMIN — QUETIAPINE FUMARATE 50 MILLIGRAM(S): 200 TABLET, FILM COATED ORAL at 17:19

## 2017-02-09 RX ADMIN — QUETIAPINE FUMARATE 50 MILLIGRAM(S): 200 TABLET, FILM COATED ORAL at 05:39

## 2017-02-09 RX ADMIN — PANTOPRAZOLE SODIUM 40 MILLIGRAM(S): 20 TABLET, DELAYED RELEASE ORAL at 05:39

## 2017-02-09 RX ADMIN — PRIMIDONE 50 MILLIGRAM(S): 250 TABLET ORAL at 17:19

## 2017-02-09 RX ADMIN — ENOXAPARIN SODIUM 113 MILLIGRAM(S): 100 INJECTION SUBCUTANEOUS at 05:37

## 2017-02-09 RX ADMIN — Medication 1 APPLICATION(S): at 11:09

## 2017-02-09 NOTE — PROGRESS NOTE ADULT - SUBJECTIVE AND OBJECTIVE BOX
HPI:  56F with a history of schizoaffective disorder, who lives in a group home. Brought to the ER found having fallen out of bed and being unresponsive. Brought to the ER, with stable BP, HR and comfortably breathing and protecting her airway.  Stat Head Ct was negative for acute CVA/bleed. Her initial labs and EKG were unremarkable, although her urine tox was + for barbituates. The alcohol/acetaminophen/salicylate levels were negative. There were was no fever and no menningeal signs. She was started on IV fluids and brought to the ICU for severe obtundation, with concerns for airway protection.  In reviewing her medication list, she is currently on multiple potentially sedating medications including Clozapine, Seroquel, and Lamicatal. (2017 11:26)    CC: feels much better, had large BM yest, Marks is out. urinating without trouble.   RN reported blood mixed with stools yest - could not quantify , dark red. no further stools since then     INTERVAL HPI/ OVERNIGHT EVENTS: Patient is seen and examined, no fever since yesterday    REVIEW OF SYSTEMS:  Constipation - resolved    Denied, chills, abd. pain, nausea, vomiting, chest pain, SOB, headache, dizziness    PHYSICAL EXAM:  Vital Signs Last 24 Hrs  T(C): 37.3, Max: 37.3 (- @ 08:11)  T(F): 99.1, Max: 99.1 (- @ 08:11)  HR: 80 (80 - 88)  BP: 108/57 (108/57 - 124/95)  BP(mean): --  RR: 18 (18 - 18)  SpO2: 94% (91% - 94%)    GENERAL: NAD, appears comfortable, awake, alert  HEENT: EOMI  Neck: supple  CHEST/LUNG: Clear to percussion bilaterally; No wheezing  HEART: S1S2+ audible  ABDOMEN: Soft, Nontender, distended; Bowel sounds present  EXTREMITIES:  no edema  CNS: AAO X 3      LABS:                                                         10.7   3.83  )-----------( 192      ( 2017 05:40 )             33.5   2017 05:40    142    |  101    |  7.0    ----------------------------<  111    4.0     |  31.0   |  0.46     Ca    8.3        2017 05:40          Urinalysis Basic - ( 2017 12:03 )    Color: Yellow / Appearance: Clear / S.025 / pH: x  Gluc: x / Ketone: Large  / Bili: Moderate / Urobili: 8 mg/dL   Blood: x / Protein: 30 mg/dL / Nitrite: Negative   Leuk Esterase: Small / RBC: 11-25 /HPF / WBC 6-10   Sq Epi: x / Non Sq Epi: Occasional / Bacteria: Few      MEDICATIONS  (STANDING):  pantoprazole    Tablet 40milliGRAM(s) Oral before breakfast  primidone 50milliGRAM(s) Oral two times a day  lamoTRIgine 150milliGRAM(s) Oral two times a day  silver sulfADIAZINE 1% Cream 1Application(s) Topical daily  enoxaparin Injectable 113milliGRAM(s) SubCutaneous every 12 hours  docusate sodium 100milliGRAM(s) Oral two times a day  senna 2Tablet(s) Oral at bedtime  polyethylene glycol 3350 17Gram(s) Oral daily  QUEtiapine 50milliGRAM(s) Oral two times a day  warfarin 5milliGRAM(s) Oral once    MEDICATIONS  (PRN):  acetaminophen   Tablet 650milliGRAM(s) Oral every 6 hours PRN For Temp greater than 38 C (100.4 F)          RADIOLOGY & ADDITIONAL TEST   EXAM:  CT CHEST                        PROCEDURE DATE:  2017   IMPRESSION:     No lung mass or other acute findings. Incidental finding right breast   with recommendations as above.

## 2017-02-09 NOTE — PHYSICAL THERAPY INITIAL EVALUATION ADULT - ADDITIONAL COMMENTS
Pt .lives in a group home with 12+6 steps to negotiate (+) handrail. Pt was independent PTA and owned no DME.

## 2017-02-09 NOTE — PROGRESS NOTE ADULT - ASSESSMENT
56 y F hx schizophrenia, HTN, recently dx'd R breast CA s/p lumpectomy/chemo, adm for AMS, intentional drug OD.     A/P    >Encephalopathy: 2/2 impulsive ingestion - drug OD - resolved  appreciate psychiatry input, 1:1 dc'd. cont to monitor. EKG 02/02/2017- qtc 476 - improved     >Schizophrenia: f/u psych recs, further management as per psych - needs inpatient psych hospitalization  pt lacks decision making capacity per psych    >Fever - blood c/s _ NG  Ct chest no infiltrate   blood c.s 01/31/2017  UA dirty - repeat urine culture - NG  will ct rocephine for a  UTI complete 7days, given persistent low grade temp, Tachycardia, urinary retention and lower abd pain,  urine culture - NG    b/l LE doppler r/o DVT - s/o chronic DVT - per Vasc sx - ct AC given her high risk.       Chronic DVT - start AC given risk factor - immobility and spontaneous DVT  Discussed with brother Kim over phone - All risks and benefits oF AC discussed in details - agrees with AC at this time given benefits outweigh risks.   If continues to have Blood in stools , will need to dc anticoagulation and consider IVCf ilter? will discuss with vascular. Monitor CBC and stools.     >Urinary retention   resolved    >Hypokalemia - potassium supplement  f/u BMP in am     >HTN: Stable   monitor BP, cont. to hold medication for now     >Lung mass on cxr  CT chest no lung mass noted, ? Hyperdense lesion in the right retroareolar breast with associated fat necrosis, will need outpatient f/u- mammogram     >DVT: lovenox

## 2017-02-09 NOTE — PHYSICAL THERAPY INITIAL EVALUATION ADULT - PERTINENT HX OF CURRENT PROBLEM, REHAB EVAL
As per H&P;n56 y F hx schizophrenia, HTN, recently dx'd R breast CA s/p lumpectomy/chemo, adm for AMS, intentional drug OD.

## 2017-02-09 NOTE — PHYSICAL THERAPY INITIAL EVALUATION ADULT - PLANNED THERAPY INTERVENTIONS, PT EVAL
transfer training/stair training/strengthening/ROM/gait training/bed mobility training/balance training

## 2017-02-09 NOTE — PHYSICAL THERAPY INITIAL EVALUATION ADULT - CRITERIA FOR SKILLED THERAPEUTIC INTERVENTIONS
predicted duration of therapy intervention/rehab potential/anticipated equipment needs at discharge/risk reduction/prevention/impairments found/anticipated discharge recommendation/functional limitations in following categories/therapy frequency

## 2017-02-10 DIAGNOSIS — R50.9 FEVER, UNSPECIFIED: ICD-10-CM

## 2017-02-10 LAB
CRP SERPL-MCNC: 1.3 MG/DL — HIGH (ref 0–0.4)
ERYTHROCYTE [SEDIMENTATION RATE] IN BLOOD: 50 MM/HR — HIGH (ref 0–20)
HCT VFR BLD CALC: 34.5 % — LOW (ref 37–47)
HGB BLD-MCNC: 11 G/DL — LOW (ref 12–16)
INR BLD: 1.26 RATIO — HIGH (ref 0.88–1.16)
MCHC RBC-ENTMCNC: 29.2 PG — SIGNIFICANT CHANGE UP (ref 27–31)
MCHC RBC-ENTMCNC: 31.9 G/DL — LOW (ref 32–36)
MCV RBC AUTO: 91.5 FL — SIGNIFICANT CHANGE UP (ref 81–99)
PLATELET # BLD AUTO: 209 K/UL — SIGNIFICANT CHANGE UP (ref 150–400)
PROTHROM AB SERPL-ACNC: 13.9 SEC — HIGH (ref 10–13.1)
RBC # BLD: 3.77 M/UL — LOW (ref 4.4–5.2)
RBC # FLD: 14.7 % — SIGNIFICANT CHANGE UP (ref 11–15.6)
WBC # BLD: 3.76 K/UL — LOW (ref 4.8–10.8)
WBC # FLD AUTO: 3.76 K/UL — LOW (ref 4.8–10.8)

## 2017-02-10 PROCEDURE — 99222 1ST HOSP IP/OBS MODERATE 55: CPT

## 2017-02-10 PROCEDURE — 99232 SBSQ HOSP IP/OBS MODERATE 35: CPT

## 2017-02-10 PROCEDURE — 99233 SBSQ HOSP IP/OBS HIGH 50: CPT

## 2017-02-10 PROCEDURE — 71020: CPT | Mod: 26

## 2017-02-10 RX ORDER — WARFARIN SODIUM 2.5 MG/1
7.5 TABLET ORAL ONCE
Qty: 0 | Refills: 0 | Status: COMPLETED | OUTPATIENT
Start: 2017-02-10 | End: 2017-02-10

## 2017-02-10 RX ADMIN — Medication 100 MILLIGRAM(S): at 05:40

## 2017-02-10 RX ADMIN — ENOXAPARIN SODIUM 113 MILLIGRAM(S): 100 INJECTION SUBCUTANEOUS at 05:38

## 2017-02-10 RX ADMIN — LAMOTRIGINE 150 MILLIGRAM(S): 25 TABLET, ORALLY DISINTEGRATING ORAL at 05:39

## 2017-02-10 RX ADMIN — WARFARIN SODIUM 7.5 MILLIGRAM(S): 2.5 TABLET ORAL at 22:44

## 2017-02-10 RX ADMIN — Medication 100 MILLIGRAM(S): at 17:36

## 2017-02-10 RX ADMIN — PANTOPRAZOLE SODIUM 40 MILLIGRAM(S): 20 TABLET, DELAYED RELEASE ORAL at 05:40

## 2017-02-10 RX ADMIN — ENOXAPARIN SODIUM 113 MILLIGRAM(S): 100 INJECTION SUBCUTANEOUS at 17:36

## 2017-02-10 RX ADMIN — QUETIAPINE FUMARATE 50 MILLIGRAM(S): 200 TABLET, FILM COATED ORAL at 17:36

## 2017-02-10 RX ADMIN — Medication 1 APPLICATION(S): at 13:27

## 2017-02-10 RX ADMIN — PRIMIDONE 50 MILLIGRAM(S): 250 TABLET ORAL at 17:36

## 2017-02-10 RX ADMIN — PRIMIDONE 50 MILLIGRAM(S): 250 TABLET ORAL at 05:40

## 2017-02-10 RX ADMIN — SENNA PLUS 2 TABLET(S): 8.6 TABLET ORAL at 22:44

## 2017-02-10 RX ADMIN — LAMOTRIGINE 150 MILLIGRAM(S): 25 TABLET, ORALLY DISINTEGRATING ORAL at 17:36

## 2017-02-10 RX ADMIN — POLYETHYLENE GLYCOL 3350 17 GRAM(S): 17 POWDER, FOR SOLUTION ORAL at 13:27

## 2017-02-10 RX ADMIN — QUETIAPINE FUMARATE 50 MILLIGRAM(S): 200 TABLET, FILM COATED ORAL at 05:39

## 2017-02-10 NOTE — PROGRESS NOTE ADULT - ASSESSMENT
56 y F hx schizophrenia, HTN, recently dx'd R breast CA s/p lumpectomy/chemo, adm for AMS, intentional drug OD.Brought to the ER, with stable BP, HR and comfortably breathing and protecting her airway.  Stat Head Ct was negative for acute CVA/bleed. Her initial labs and EKG were unremarkable, although her urine tox was + for barbituates. The alcohol/acetaminophen/salicylate levels were negative. There were was no fever and no menningeal signs. She was started on IV fluids and brought to the ICU for severe obtundation, with concerns for airway protection.  In reviewing her medication list, she was on multiple potentially sedating medications including Clozapine, Seroquel, and Lamicatal.  AMS resolved over 2 days. Back to her basen. Per psych - lacks decision making capacity, needs inpt pscych admission when medically stable.   Spiked low grade temp since last 5 days ,treated for UTI, urinary retention, resolved at present. Doppler showed Chronic LE DVT - started on Full AC. ID consulted for FUO, cultures all negative, procalcitonin negative. ESR - high. observing off antibiotics    A/P    >Encephalopathy: 2/2 impulsive ingestion - drug OD - resolved  appreciate psychiatry input, 1:1 dc'd. cont to monitor. EKG 02/02/2017- qtc 476 - improved     >Schizophrenia: f/u psych recs, further management as per psych - needs inpatient psych hospitalization - needs to be independent for that or else may need [facility].  pt lacks decision making capacity per psych    >Fever - blood c/s _ NG  Ct chest no infiltrate   blood c.s 01/31/2017  UA dirty - repeat urine culture - NG  will ct rocephine for a  UTI complete 7days, given persistent low grade temp, Tachycardia, urinary retention and lower abd pain,  urine culture - NG    b/l LE doppler r/o DVT - s/o chronic DVT - per Vasc sx - ct AC given her high risk.       Chronic DVT - start AC given risk factor - immobility and spontaneous DVT  Discussed with brother Kim over phone - All risks and benefits oF AC discussed in details - agrees with AC at this time given benefits outweigh risks.   If continues to have Blood in stools , will need to dc anticoagulation and consider IVCf ilter? will discuss with vascular. Monitor CBC and stools.     >Urinary retention   resolved    >Hypokalemia - potassium supplement      >HTN: Stable   monitor BP, cont. to hold medication for now     >Lung mass on cxr  CT chest no lung mass noted, ? Hyperdense lesion in the right retroareolar breast with associated fat necrosis, will need outpatient f/u- mammogram     >DVT: lovenox

## 2017-02-10 NOTE — CONSULT NOTE ADULT - SUBJECTIVE AND OBJECTIVE BOX
[facility] [place]  =======================================================  Elier Beatty MD  Diplomates American Board of Internal Medicine and Infectious Diseases  =======================================================    [id number]  MANE MAYBERRY is a 56y  Female   56 y.o. F with schizoaffective disorder, HTN, from [facility] home, admitted for workup of altered mentation, suspected to be from overdose of her routine medications. Utox at time of admission was positive for Barbiturate   Blood culture from 1/31/17 x 2 sets and Urine Cx was negative.  Repeat blood cx from 2/3/17 also negative x 2 sets.   A martell placed at time of admission was removed on 2/4/17 leading to retention and re-insertion of martell. At that time,  she received Ceftriaxone 1 gram Q24H from 2/4/17 to 2/8/17.  Repeat Urine cx 2/4/17 was negative. repeat blood cx 2/7/17 was negative.  US duplex of the LE showed: " No evidence for acute DVT in the bilateral lower extremity deep venous  systems. Focal chronic right tibial peroneal trunk thrombus."          patient developed temp 100.9 on 2/2/17; temp 100.5 on 2/5/17; and temp 100.5 on 2/7/17.      =======================================================  Past Medical & Surgical Hx:  =====================  PAST MEDICAL & SURGICAL HISTORY:  Schizophrenia  No significant past surgical history      Problem List:  ==========  HEALTH ISSUES - PROBLEM Dx:  Chronic deep vein thrombosis (DVT) of tibial vein of right lower extremity: Chronic deep vein thrombosis (DVT) of tibial vein of right lower extremity  Chest mass: Chest mass  Essential hypertension: Essential hypertension  Intentional drug overdose, subsequent encounter: Intentional drug overdose, subsequent encounter  Encephalopathy acute: Encephalopathy acute  Barbital (barbitone) overdose, undetermined intent, initial encounter: Barbital (barbitone) overdose, undetermined intent, initial encounter  Schizophrenia, unspecified type: Schizophrenia, unspecified type  Altered mental state: Altered mental state          Social Hx:  =======    FAMILY HISTORY:      Allergies    Flagyl (Rash)    Intolerances         MEDICATIONS  (STANDING):  pantoprazole    Tablet 40milliGRAM(s) Oral before breakfast  primidone 50milliGRAM(s) Oral two times a day  lamoTRIgine 150milliGRAM(s) Oral two times a day  silver sulfADIAZINE 1% Cream 1Application(s) Topical daily  enoxaparin Injectable 113milliGRAM(s) SubCutaneous every 12 hours  docusate sodium 100milliGRAM(s) Oral two times a day  senna 2Tablet(s) Oral at bedtime  polyethylene glycol 3350 17Gram(s) Oral daily  QUEtiapine 50milliGRAM(s) Oral two times a day  ondansetron Injectable 4milliGRAM(s) IV Push once  warfarin 7.5milliGRAM(s) Oral once    MEDICATIONS  (PRN):  acetaminophen   Tablet 650milliGRAM(s) Oral every 6 hours PRN For Temp greater than 38 C (100.4 F)        =======================================================  REVIEW OF SYSTEMS:  CONSTITUTIONAL:  as per HPI  HEENT:  Eyes:  No diplopia or blurred vision. ENT:  No earache, sore throat or runny nose.  CARDIOVASCULAR:  No pressure, squeezing, strangling, tightness, heaviness or aching about the chest, neck, axilla or epigastrium.  RESPIRATORY:  No cough, shortness of breath, PND or orthopnea.  GASTROINTESTINAL:  No nausea, vomiting or diarrhea.  GENITOURINARY:  No dysuria, frequency or urgency. No Blood in urine  MUSCULOSKELETAL:  no joint aches, no muscle pain  SKIN:  No change in skin, hair or nails.  NEUROLOGIC:  No paresthesias, fasciculations, seizures or weakness.  PSYCHIATRIC:  No disorder of thought or mood.  ENDOCRINE:  No heat or cold intolerance, polyuria or polydipsia.  HEMATOLOGICAL:  No easy bruising or bleeding.     =======================================================  Physical Exam:  ============  Vital Signs Last 24 Hrs  T(C): 36.9, Max: 36.9 (02-09 @ 23:10)  T(F): 98.4, Max: 98.5 (02-09 @ 23:10)  HR: 92 (83 - 92)  BP: 105/61 (105/61 - 116/69)  BP(mean): --  RR: --  SpO2: 94% (94% - 94%)    GEN: NAD, pleasant  HEENT: normocephalic and atraumatic. EOMI. EMELI.    NECK: Supple. No carotid bruits.  No lymphadenopathy or thyromegaly.  LUNGS: Clear to auscultation.  HEART: Regular rate and rhythm without murmur.  ABDOMEN: Soft, nontender, and nondistended.  Positive bowel sounds.    EXTREMITIES: Without any cyanosis, clubbing, rash, lesions or edema.  NEUROLOGIC: Cranial nerves II through XII are grossly intact.  PSYCHIATRIC: Appropriate affect .  SKIN: No ulceration or induration present.    =======================================================  Labs:  ====   09 Feb 2017 05:40    142    |  101    |  7.0    ----------------------------<  111    4.0     |  31.0   |  0.46     Ca    8.3        09 Feb 2017 05:40                            11.0   3.76  )-----------( 209      ( 10 Feb 2017 05:40 )             34.5       PT/INR - ( 10 Feb 2017 08:06 )   PT: 13.9 sec;   INR: 1.26 ratio            RECENT CULTURES:  02-07 .Blood Blood-Peripheral XXXX XXXX   No growth at 48 hours    02-07 .Blood Blood-Peripheral XXXX XXXX   No growth at 48 hours    02-04 .Urine Catheterized XXXX XXXX   No growth    ==============    ESR 50        ==========     EXAM:  CT CHEST                          PROCEDURE DATE:  02/02/2017        INTERPRETATION:  Clinical information: Possible right central lung mass   on x-ray    Comparison: Radiograph same day    PROCEDURE:   CT of the Chest was performed without intravenous contrast.    FINDINGS:    CHEST:    CHEST WALL AND LOWER NECK: Hyperdense lesion in the right retroareolar   breast with associated fat necrosis, possibly related to prior   instrumentation, however recommend correlation with prior breast imaging   to exclude underlying mass.  MEDIASTINUM AND CAT: No intrathoracic adenopathy, no fluid collection.  HEART: Within normal limits  VESSELS: Unopacified great vessels are unremarkable.  LUNG AND LARGE AIRWAYS: No mass, consolidation. Trace pleural effusions.    UPPER ABDOMEN: Within normal limits    MUSCULOSKELETAL: Within normal limits    IMPRESSION:     No lung mass or other acute findings. Incidental finding right breast   with recommendations as above.      ===========   EXAM:  CHEST SINGLE VIEW FRONTAL                          PROCEDURE DATE:  02/03/2017        INTERPRETATION:  History: Fever    Technique:  AP portable    Comparisons:  Chest x-ray dated 1/31/2017    Findings: The lungs are clear. There are no infiltrates, congestion or   pleural effusions. The pulmonary vasculature and aorta are normal for   age. Heart size is unremarkable. The thorax is normal for age.    Impression: No acute pulmonary disease.          =============     EXAM:  US DPLX LWR EXT VEINS COMPL BI                          PROCEDURE DATE:  02/05/2017        INTERPRETATION:  CLINICAL HISTORY: Fever, elevated d-dimer    COMPARISON: None.    TECHNIQUE: Grayscale color and Doppler examination of the bilateral lower   extremity deep venous systems.    FINDINGS:    Sonographic evaluation of the bilateral common femoral veins, superficial   femoral veins and popliteal veins shows normal flow, compressibility,   respiratory variation and augmentation.    Nonspecific linear echogenicity in the right tibial peroneal trunk may   suggest chronic thrombus.    Complex right-sided Baker's cyst measures 4.8 x 1.9 x 2.7 cm.    IMPRESSION:    No evidence for acute DVT in the bilateral lower extremity deep venous   systems.  Focal chronic right tibial peroneal trunk thrombus. [facility] [place]  =======================================================  Elier Beatty MD  Diplomates American Board of Internal Medicine and Infectious Diseases  =======================================================    [id number]  MANE MAYBERRY is a 56y  Female   56 y.o. F with schizoaffective disorder, HTN, from [facility] home, admitted for workup of altered mentation, suspected to be from overdose of her routine medications. Utox at time of admission was positive for Barbiturate. patient reports that this is her THIRD SUICIDE ATTEMPT  Blood culture from 1/31/17 x 2 sets and Urine Cx was negative.  Repeat blood cx from 2/3/17 also negative x 2 sets.   A martell placed at time of admission was removed on 17 leading to retention and re-insertion of martell. At that time,  she received Ceftriaxone 1 gram Q24H from 17 to 17.  Repeat Urine cx 17 was negative. repeat blood cx 17 was negative.  US duplex of the LE showed: " No evidence for acute DVT in the bilateral lower extremity deep venous  systems. Focal chronic right tibial peroneal trunk thrombus."      review of vitals show:  patient developed temp 100.9 on 17; temp 100.5 on 17; and temp 100.5 on 17.  No episodes of hypotension noted.     =======================================================  Past Medical & Surgical Hx:  =====================  PAST MEDICAL & SURGICAL HISTORY:  Schizophrenia  No significant past surgical history      Problem List:  ==========  HEALTH ISSUES - PROBLEM Dx:  Chronic deep vein thrombosis (DVT) of tibial vein of right lower extremity: Chronic deep vein thrombosis (DVT) of tibial vein of right lower extremity  Chest mass: Chest mass  Essential hypertension: Essential hypertension  Intentional drug overdose, subsequent encounter: Intentional drug overdose, subsequent encounter  Encephalopathy acute: Encephalopathy acute  Barbital (barbitone) overdose, undetermined intent, initial encounter: Barbital (barbitone) overdose, undetermined intent, initial encounter  Schizophrenia, unspecified type: Schizophrenia, unspecified type  Altered mental state: Altered mental state    Social Hx:  =======  denies toxic habits  former ETOH abuse  no IVDU  single    FAMILY HISTORY:  mother -  from metastatic lung cancer  father - old age  brothers x 2 - ok  no family history of rheumatological disease    Allergies  Flagyl (Rash)  Intolerances         MEDICATIONS  (STANDING):  pantoprazole    Tablet 40milliGRAM(s) Oral before breakfast  primidone 50milliGRAM(s) Oral two times a day  lamoTRIgine 150milliGRAM(s) Oral two times a day  silver sulfADIAZINE 1% Cream 1Application(s) Topical daily  enoxaparin Injectable 113milliGRAM(s) SubCutaneous every 12 hours  docusate sodium 100milliGRAM(s) Oral two times a day  senna 2Tablet(s) Oral at bedtime  polyethylene glycol 3350 17Gram(s) Oral daily  QUEtiapine 50milliGRAM(s) Oral two times a day  ondansetron Injectable 4milliGRAM(s) IV Push once  warfarin 7.5milliGRAM(s) Oral once    MEDICATIONS  (PRN):  acetaminophen   Tablet 650milliGRAM(s) Oral every 6 hours PRN For Temp greater than 38 C (100.4 F)            =======================================================  REVIEW OF SYSTEMS:  CONSTITUTIONAL:  as per HPI  HEENT:  Eyes:  No diplopia or blurred vision. ENT:  No earache, sore throat or runny nose.  CARDIOVASCULAR:  No pressure, squeezing, strangling, tightness, heaviness or aching about the chest, neck, axilla or epigastrium.  RESPIRATORY:  No cough, shortness of breath, PND or orthopnea.  GASTROINTESTINAL:  No nausea, vomiting or diarrhea.  GENITOURINARY:  No dysuria, frequency or urgency. No Blood in urine  MUSCULOSKELETAL:  OCCASIONAL KNEE PAINS  SKIN:  No change in skin, hair or nails.  NEUROLOGIC:  No paresthesias, fasciculations, seizures or weakness.  PSYCHIATRIC:  No disorder of thought or mood.  ENDOCRINE:  No heat or cold intolerance, polyuria or polydipsia.  HEMATOLOGICAL:  No easy bruising or bleeding.     =======================================================  Physical Exam:  ============  Vital Signs Last 24 Hrs  T(C): 36.9, Max: 36.9 ( @ 23:10)  T(F): 98.4, Max: 98.5 ( @ 23:10)  HR: 92 (83 - 92)  BP: 105/61 (105/61 - 116/69)  BP(mean): --  RR: --  SpO2: 94% (94% - 94%)    GEN: NAD, pleasant  HEENT: normocephalic and atraumatic. EOMI. EMELI.    NECK: Supple. No carotid bruits.  No lymphadenopathy or thyromegaly.  LUNGS: Clear to auscultation.  NO Costovertebral angle tenderness   HEART: Regular rate and rhythm without murmur.  ABDOMEN: Soft, nontender, and nondistended.  Positive bowel sounds.    EXTREMITIES: Without any cyanosis, clubbing, rash, lesions or edema.  EQUAL warmth of knees bilaterally  No ulnar deviation of fingers  NEUROLOGIC: Cranial nerves II through XII are grossly intact.  PSYCHIATRIC: Appropriate affect .  SKIN: No ulceration or induration present.    =======================================================  Labs:  ====   2017 05:40    142    |  101    |  7.0    ----------------------------<  111    4.0     |  31.0   |  0.46     Ca    8.3        2017 05:40                            11.0   3.76  )-----------( 209      ( 10 Feb 2017 05:40 )             34.5       PT/INR - ( 10 Feb 2017 08:06 )   PT: 13.9 sec;   INR: 1.26 ratio            RECENT CULTURES:   .Blood Blood-Peripheral XXXX XXXX   No growth at 48 hours    - .Blood Blood-Peripheral XXXX XXXX   No growth at 48 hours    - .Urine Catheterized XXXX XXXX   No growth    ==============    ESR 50  CRP 1.3    ==========  EXAM:  CT CHEST                          PROCEDURE DATE:  2017      INTERPRETATION:  Clinical information: Possible right central lung mass   on x-ray    Comparison: Radiograph same day    PROCEDURE:   CT of the Chest was performed without intravenous contrast.    FINDINGS:    CHEST:    CHEST WALL AND LOWER NECK: Hyperdense lesion in the right retroareolar   breast with associated fat necrosis, possibly related to prior   instrumentation, however recommend correlation with prior breast imaging   to exclude underlying mass.  MEDIASTINUM AND CAT: No intrathoracic adenopathy, no fluid collection.  HEART: Within normal limits  VESSELS: Unopacified great vessels are unremarkable.  LUNG AND LARGE AIRWAYS: No mass, consolidation. Trace pleural effusions.  UPPER ABDOMEN: Within normal limits  MUSCULOSKELETAL: Within normal limits  IMPRESSION:   No lung mass or other acute findings. Incidental finding right breast   with recommendations as above.      ===========  EXAM:  CHEST SINGLE VIEW FRONTAL                        PROCEDURE DATE:  2017      INTERPRETATION:  History: Fever  Technique:  AP portable    Comparisons:  Chest x-ray dated 2017    Findings: The lungs are clear. There are no infiltrates, congestion or   pleural effusions. The pulmonary vasculature and aorta are normal for   age. Heart size is unremarkable. The thorax is normal for age.    Impression: No acute pulmonary disease.    =============  EXAM:  US DPLX LWR EXT VEINS COMPL BI                          PROCEDURE DATE:  2017        INTERPRETATION:  CLINICAL HISTORY: Fever, elevated d-dimer    COMPARISON: None.    TECHNIQUE: Grayscale color and Doppler examination of the bilateral lower   extremity deep venous systems.    FINDINGS:    Sonographic evaluation of the bilateral common femoral veins, superficial   femoral veins and popliteal veins shows normal flow, compressibility,   respiratory variation and augmentation.    Nonspecific linear echogenicity in the right tibial peroneal trunk may   suggest chronic thrombus.    Complex right-sided Baker's cyst measures 4.8 x 1.9 x 2.7 cm.    IMPRESSION:    No evidence for acute DVT in the bilateral lower extremity deep venous   systems.  Focal chronic right tibial peroneal trunk thrombus.

## 2017-02-10 NOTE — PROGRESS NOTE ADULT - SUBJECTIVE AND OBJECTIVE BOX
HPI:  56F with a history of schizoaffective disorder, who lives in a group home. Brought to the ER found having fallen out of bed and being unresponsive. Brought to the ER, with stable BP, HR and comfortably breathing and protecting her airway.  Stat Head Ct was negative for acute CVA/bleed. Her initial labs and EKG were unremarkable, although her urine tox was + for barbituates. The alcohol/acetaminophen/salicylate levels were negative. There were was no fever and no menningeal signs. She was started on IV fluids and brought to the ICU for severe obtundation, with concerns for airway protection.  In reviewing her medication list, she is currently on multiple potentially sedating medications including Clozapine, Seroquel, and Lamicatal. (2017 11:26)    CC: feels much better, no more blood in stools.   no complaints     INTERVAL HPI/ OVERNIGHT EVENTS: Patient is seen and examined, spiked 100.3F    REVIEW OF SYSTEMS:  Constipation - resolved  Denied, chills, abd. pain, nausea, vomiting, chest pain, SOB, headache, dizziness    PHYSICAL EXAM:  Vital Signs Last 24 Hrs  T(C): 36.9, Max: 37.9 ( @ 15:23)  T(F): 98.4, Max: 100.3 (- @ 15:23)  HR: 92 (83 - 92)  BP: 105/61 (105/61 - 123/79)  BP(mean): --  RR: 18 (18 - 18)  SpO2: 94% (94% - 97%)    GENERAL: NAD, appears comfortable, awake, alert  HEENT: EOMI  Neck: supple  CHEST/LUNG: Clear to percussion bilaterally; No wheezing  HEART: S1S2+ audible  ABDOMEN: Soft, Nontender, distended; Bowel sounds present  EXTREMITIES:  no edema  CNS: AAO X 3      LABS:                                                         10.7   3.83  )-----------( 192      ( 2017 05:40 )             33.5   2017 05:40    142    |  101    |  7.0    ----------------------------<  111    4.0     |  31.0   |  0.46     Ca    8.3        2017 05:40          Urinalysis Basic - ( 2017 12:03 )    Color: Yellow / Appearance: Clear / S.025 / pH: x  Gluc: x / Ketone: Large  / Bili: Moderate / Urobili: 8 mg/dL   Blood: x / Protein: 30 mg/dL / Nitrite: Negative   Leuk Esterase: Small / RBC: 11-25 /HPF / WBC 6-10   Sq Epi: x / Non Sq Epi: Occasional / Bacteria: Few        MEDICATIONS  (STANDING):  pantoprazole    Tablet 40milliGRAM(s) Oral before breakfast  primidone 50milliGRAM(s) Oral two times a day  lamoTRIgine 150milliGRAM(s) Oral two times a day  silver sulfADIAZINE 1% Cream 1Application(s) Topical daily  enoxaparin Injectable 113milliGRAM(s) SubCutaneous every 12 hours  docusate sodium 100milliGRAM(s) Oral two times a day  senna 2Tablet(s) Oral at bedtime  polyethylene glycol 3350 17Gram(s) Oral daily  QUEtiapine 50milliGRAM(s) Oral two times a day  ondansetron Injectable 4milliGRAM(s) IV Push once  warfarin 7.5milliGRAM(s) Oral once      RADIOLOGY & ADDITIONAL TEST   EXAM:  CT CHEST                        PROCEDURE DATE:  2017   IMPRESSION:     No lung mass or other acute findings. Incidental finding right breast   with recommendations as above.

## 2017-02-10 NOTE — CONSULT NOTE ADULT - ASSESSMENT
This 56 y.o. woman with schizophrenia, HTN, here for altered mentation due to drug overdose, resolved, found with intermittent fever and focal chronic right tibial peroneal trunk thrombus. This 56 y.o. woman with schizophrenia, HTN, here for altered mentation due to drug overdose, resolved, found with intermittent fever and focal chronic right tibial peroneal trunk thrombus.    fever could me from DVT.  No clear infectious disease at this time. Recent procalcitonin on 2/8./17 was less than detectable range.  Patient may be discharged from ID point of view.  Consider out-patient rheumatology evaluation vs inpatient evaluation

## 2017-02-10 NOTE — CONSULT NOTE ADULT - PROBLEM SELECTOR RECOMMENDATION 2
" No evidence for acute DVT in the bilateral lower extremity deep venous  systems. Focal chronic right tibial peroneal trunk thrombus."  - this may be cause for fever.

## 2017-02-10 NOTE — CONSULT NOTE ADULT - PROBLEM SELECTOR RECOMMENDATION 4
Blood pressure stable off antibiotics for 48 hours. Blood pressure stable off antibiotics for 48 hours.  Defer additional antibiotics

## 2017-02-11 LAB
INR BLD: 1.21 RATIO — HIGH (ref 0.88–1.16)
PROTHROM AB SERPL-ACNC: 13.3 SEC — HIGH (ref 10–13.1)

## 2017-02-11 PROCEDURE — 99232 SBSQ HOSP IP/OBS MODERATE 35: CPT

## 2017-02-11 RX ORDER — WARFARIN SODIUM 2.5 MG/1
10 TABLET ORAL ONCE
Qty: 0 | Refills: 0 | Status: COMPLETED | OUTPATIENT
Start: 2017-02-11 | End: 2017-02-11

## 2017-02-11 RX ADMIN — QUETIAPINE FUMARATE 50 MILLIGRAM(S): 200 TABLET, FILM COATED ORAL at 05:16

## 2017-02-11 RX ADMIN — Medication 100 MILLIGRAM(S): at 05:16

## 2017-02-11 RX ADMIN — PRIMIDONE 50 MILLIGRAM(S): 250 TABLET ORAL at 05:16

## 2017-02-11 RX ADMIN — WARFARIN SODIUM 10 MILLIGRAM(S): 2.5 TABLET ORAL at 21:44

## 2017-02-11 RX ADMIN — LAMOTRIGINE 150 MILLIGRAM(S): 25 TABLET, ORALLY DISINTEGRATING ORAL at 17:51

## 2017-02-11 RX ADMIN — LAMOTRIGINE 150 MILLIGRAM(S): 25 TABLET, ORALLY DISINTEGRATING ORAL at 05:16

## 2017-02-11 RX ADMIN — Medication 650 MILLIGRAM(S): at 23:10

## 2017-02-11 RX ADMIN — PRIMIDONE 50 MILLIGRAM(S): 250 TABLET ORAL at 17:51

## 2017-02-11 RX ADMIN — POLYETHYLENE GLYCOL 3350 17 GRAM(S): 17 POWDER, FOR SOLUTION ORAL at 12:25

## 2017-02-11 RX ADMIN — Medication 1 APPLICATION(S): at 12:25

## 2017-02-11 RX ADMIN — QUETIAPINE FUMARATE 50 MILLIGRAM(S): 200 TABLET, FILM COATED ORAL at 17:51

## 2017-02-11 RX ADMIN — ENOXAPARIN SODIUM 113 MILLIGRAM(S): 100 INJECTION SUBCUTANEOUS at 17:51

## 2017-02-11 RX ADMIN — ENOXAPARIN SODIUM 113 MILLIGRAM(S): 100 INJECTION SUBCUTANEOUS at 05:16

## 2017-02-11 RX ADMIN — Medication 100 MILLIGRAM(S): at 17:51

## 2017-02-11 RX ADMIN — PANTOPRAZOLE SODIUM 40 MILLIGRAM(S): 20 TABLET, DELAYED RELEASE ORAL at 05:17

## 2017-02-11 NOTE — PROGRESS NOTE ADULT - ASSESSMENT
56 y F hx schizophrenia, HTN, recently dx'd R breast CA s/p lumpectomy/chemo, adm for AMS, intentional drug OD.Brought to the ER, with stable BP, HR and comfortably breathing and protecting her airway.  Stat Head Ct was negative for acute CVA/bleed. Her initial labs and EKG were unremarkable, although her urine tox was + for barbituates. The alcohol/acetaminophen/salicylate levels were negative. There were was no fever and no menningeal signs. She was started on IV fluids and brought to the ICU for severe obtundation, with concerns for airway protection.  In reviewing her medication list, she was on multiple potentially sedating medications including Clozapine, Seroquel, and Lamicatal.  AMS resolved over 2 days. Back to her basen. Per psych - lacks decision making capacity, needs inpt pscych admission when medically stable.   Spiked low grade temp since last 5 days ,treated for UTI, urinary retention, resolved at present. Doppler showed Chronic LE DVT - started on Full AC. ID consulted for FUO, cultures all negative, procalcitonin negative. ESR - high. observing off antibiotics    A/P    >Encephalopathy: 2/2 impulsive ingestion - drug OD - resolved  appreciate psychiatry input, 1:1 dc'd. cont to monitor. EKG 02/02/2017- qtc 476 - improved     >Schizophrenia: f/u psych recs, further management as per psych - needs inpatient psych hospitalization - needs to be independent for that or else may need [facility].  pt lacks decision making capacity per psych    >Fever - blood c/s _ NG  Ct chest no infiltrate   blood c/s -neg 01/31/2017   chronic DVT - per Vasc sx - ct AC given her high risk.   could be sec to DVT  ID discontinued as its likely not sec to infection      Chronic DVT - on AC now  Discussed with brother Kim over phone - All risks and benefits oF AC discussed in details - agrees with AC at this time given benefits outweigh risks.   If continues to have Blood in stools , will need to dc anticoagulation and consider IVCf ilter? will discuss with vascular. Monitor CBC and stools  H.H stable though INR still low  give Coumadin 10 mg today  INR in AM  on ther lovenox.     >Urinary retention   resolved    >Hypokalemia - potassium supplement      >HTN: Stable   monitor BP, cont. to hold medication for now     >Lung mass on cxr  CT chest no lung mass noted, ? Hyperdense lesion in the right retroareolar breast with associated fat necrosis, will need outpatient f/u- mammogram     >DVT: lovenox

## 2017-02-11 NOTE — PROGRESS NOTE ADULT - SUBJECTIVE AND OBJECTIVE BOX
CHIEF COMPLAINT/INTERVAL HISTORY:    Patient is a 56y old  Female who presents with a chief complaint of Altered mental status (31 Jan 2017 11:26)      HPI:  56F with a history of schizoaffective disorder, who lives in a group home. Brought to the ER found having fallen out of bed and being unresponsive. Brought to the ER, with stable BP, HR and comfortably breathing and protecting her airway.  Stat Head Ct was negative for cute CVA/bleed. Her initial labs and EKG were unremarkable, although her urine tox was + for barbituates. The alcohol/acetaminophen/salicylate levels were negative. There were was no fever and no menningeal signs. She was started on IV fluids and brought to the ICU for severe obtundation, with concerns for airway protection.  In reviewing her medication list, she is currently on multiple potentially sedating medications including Clozapine, Seroquel, and Lamicatal. (31 Jan 2017 11:26)      SUBJECTIVE & OBJECTIVE: Pt seen and examined at bedside. denies any abdominal pain or fever .no sob,N,V, dysuria or diarrhea etc    ICU Vital Signs Last 24 Hrs  T(C): 36.3, Max: 37.7 (02-11 @ 10:03)  T(F): 97.4, Max: 99.9 (02-11 @ 10:03)  HR: 63 (63 - 71)  BP: 106/58 (106/58 - 113/72)  BP(mean): --  ABP: --  ABP(mean): --  RR: 18 (18 - 18)  SpO2: 83% (83% - 96%)        MEDICATIONS  (STANDING):  pantoprazole    Tablet 40milliGRAM(s) Oral before breakfast  primidone 50milliGRAM(s) Oral two times a day  lamoTRIgine 150milliGRAM(s) Oral two times a day  silver sulfADIAZINE 1% Cream 1Application(s) Topical daily  enoxaparin Injectable 113milliGRAM(s) SubCutaneous every 12 hours  docusate sodium 100milliGRAM(s) Oral two times a day  senna 2Tablet(s) Oral at bedtime  polyethylene glycol 3350 17Gram(s) Oral daily  QUEtiapine 50milliGRAM(s) Oral two times a day  ondansetron Injectable 4milliGRAM(s) IV Push once  warfarin 10milliGRAM(s) Oral once    MEDICATIONS  (PRN):  acetaminophen   Tablet 650milliGRAM(s) Oral every 6 hours PRN For Temp greater than 38 C (100.4 F)        PHYSICAL EXAM:    GENERAL: NAD, well-groomed, well-developed  NERVOUS SYSTEM:  Alert & Oriented X3, Motor Strength 5/5 B/L upper and lower extremities; DTRs 2+ intact and symmetric  CHEST/LUNG: Clear to auscultation bilaterally; No rales, rhonchi, wheezing, or rubs  HEART: Regular rate and rhythm; No murmurs, rubs, or gallops  ABDOMEN: Soft, Nontender, Nondistended; Bowel sounds present  EXTREMITIES:  2+ Peripheral Pulses, No clubbing, cyanosis, or edema    LABS:                        11.0   3.76  )-----------( 209      ( 10 Feb 2017 05:40 )             34.5           PT/INR - ( 11 Feb 2017 05:32 )   PT: 13.3 sec;   INR: 1.21 ratio               CAPILLARY BLOOD GLUCOSE      RECENT CULTURES:      RADIOLOGY & ADDITIONAL TESTS:    Health Issues:  Altered mental status  Altered mental state  Low grade fever  Chronic deep vein thrombosis (DVT) of tibial vein of right lower extremity  Chest mass  Essential hypertension  Intentional drug overdose, subsequent encounter  Encephalopathy acute  Barbital (barbitone) overdose, undetermined intent, initial encounter  Schizophrenia, unspecified type  Altered mental state      DVT/GI ppx  Discussed with pt @ bedside

## 2017-02-12 LAB
CULTURE RESULTS: SIGNIFICANT CHANGE UP
CULTURE RESULTS: SIGNIFICANT CHANGE UP
INR BLD: 1.31 RATIO — HIGH (ref 0.88–1.16)
PROTHROM AB SERPL-ACNC: 14.5 SEC — HIGH (ref 10–13.1)
SPECIMEN SOURCE: SIGNIFICANT CHANGE UP
SPECIMEN SOURCE: SIGNIFICANT CHANGE UP

## 2017-02-12 PROCEDURE — 99232 SBSQ HOSP IP/OBS MODERATE 35: CPT

## 2017-02-12 RX ORDER — WARFARIN SODIUM 2.5 MG/1
10 TABLET ORAL ONCE
Qty: 0 | Refills: 0 | Status: COMPLETED | OUTPATIENT
Start: 2017-02-12 | End: 2017-02-12

## 2017-02-12 RX ADMIN — POLYETHYLENE GLYCOL 3350 17 GRAM(S): 17 POWDER, FOR SOLUTION ORAL at 11:19

## 2017-02-12 RX ADMIN — QUETIAPINE FUMARATE 50 MILLIGRAM(S): 200 TABLET, FILM COATED ORAL at 18:49

## 2017-02-12 RX ADMIN — QUETIAPINE FUMARATE 50 MILLIGRAM(S): 200 TABLET, FILM COATED ORAL at 05:41

## 2017-02-12 RX ADMIN — Medication 1 APPLICATION(S): at 11:18

## 2017-02-12 RX ADMIN — Medication 100 MILLIGRAM(S): at 18:50

## 2017-02-12 RX ADMIN — PRIMIDONE 50 MILLIGRAM(S): 250 TABLET ORAL at 18:50

## 2017-02-12 RX ADMIN — PRIMIDONE 50 MILLIGRAM(S): 250 TABLET ORAL at 05:42

## 2017-02-12 RX ADMIN — WARFARIN SODIUM 10 MILLIGRAM(S): 2.5 TABLET ORAL at 22:29

## 2017-02-12 RX ADMIN — LAMOTRIGINE 150 MILLIGRAM(S): 25 TABLET, ORALLY DISINTEGRATING ORAL at 05:42

## 2017-02-12 RX ADMIN — ENOXAPARIN SODIUM 113 MILLIGRAM(S): 100 INJECTION SUBCUTANEOUS at 05:43

## 2017-02-12 RX ADMIN — PANTOPRAZOLE SODIUM 40 MILLIGRAM(S): 20 TABLET, DELAYED RELEASE ORAL at 05:42

## 2017-02-12 RX ADMIN — SENNA PLUS 2 TABLET(S): 8.6 TABLET ORAL at 22:29

## 2017-02-12 RX ADMIN — LAMOTRIGINE 150 MILLIGRAM(S): 25 TABLET, ORALLY DISINTEGRATING ORAL at 18:50

## 2017-02-12 RX ADMIN — ENOXAPARIN SODIUM 113 MILLIGRAM(S): 100 INJECTION SUBCUTANEOUS at 18:49

## 2017-02-12 RX ADMIN — Medication 100 MILLIGRAM(S): at 05:42

## 2017-02-12 NOTE — PROGRESS NOTE ADULT - SUBJECTIVE AND OBJECTIVE BOX
CHIEF COMPLAINT/INTERVAL HISTORY:    Patient is a 56y old  Female who presents with a chief complaint of Altered mental status (31 Jan 2017 11:26)      HPI:  56F with a history of schizoaffective disorder, who lives in a group home. Brought to the ER found having fallen out of bed and being unresponsive. Brought to the ER, with stable BP, HR and comfortably breathing and protecting her airway.  Stat Head Ct was negative for cute CVA/bleed. Her initial labs and EKG were unremarkable, although her urine tox was + for barbituates. The alcohol/acetaminophen/salicylate levels were negative. There were was no fever and no menningeal signs. She was started on IV fluids and brought to the ICU for severe obtundation, with concerns for airway protection.  In reviewing her medication list, she is currently on multiple potentially sedating medications including Clozapine, Seroquel, and Lamicatal. (31 Jan 2017 11:26)      SUBJECTIVE & OBJECTIVE: Pt seen and examined at bedside. C/O tiredness today >Had fever as well.Denies abdo pain,shortness of breath,bloody stool,diarrhea,dysuria,N,V,etc    ICU Vital Signs Last 24 Hrs  T(C): 37.9, Max: 38 (02-11 @ 23:08)  T(F): 100.3, Max: 100.4 (02-11 @ 23:08)  HR: 67 (66 - 95)  BP: 97/53 (92/65 - 104/66)  BP(mean): --  ABP: --  ABP(mean): --  RR: 18 (18 - 20)  SpO2: 95% (93% - 98%)        MEDICATIONS  (STANDING):  pantoprazole    Tablet 40milliGRAM(s) Oral before breakfast  primidone 50milliGRAM(s) Oral two times a day  lamoTRIgine 150milliGRAM(s) Oral two times a day  silver sulfADIAZINE 1% Cream 1Application(s) Topical daily  enoxaparin Injectable 113milliGRAM(s) SubCutaneous every 12 hours  docusate sodium 100milliGRAM(s) Oral two times a day  senna 2Tablet(s) Oral at bedtime  polyethylene glycol 3350 17Gram(s) Oral daily  QUEtiapine 50milliGRAM(s) Oral two times a day  ondansetron Injectable 4milliGRAM(s) IV Push once  warfarin 10milliGRAM(s) Oral once    MEDICATIONS  (PRN):  acetaminophen   Tablet 650milliGRAM(s) Oral every 6 hours PRN For Temp greater than 38 C (100.4 F)        PHYSICAL EXAM:    GENERAL: NAD, well-groomed, well-developed  NERVOUS SYSTEM:  Alert & Oriented X3, Motor Strength 5/5 B/L upper and lower extremities; DTRs 2+ intact and symmetric  CHEST/LUNG: Clear to auscultation bilaterally; No rales, rhonchi, wheezing, or rubs  HEART: Regular rate and rhythm; No murmurs, rubs, or gallops  ABDOMEN: Soft, Nontender, Nondistended; Bowel sounds present  EXTREMITIES:  2+ Peripheral Pulses, No clubbing, cyanosis, or edema    LABS:          PT/INR - ( 12 Feb 2017 05:36 )   PT: 14.5 sec;   INR: 1.31 ratio               CAPILLARY BLOOD GLUCOSE      RECENT CULTURES:      RADIOLOGY & ADDITIONAL TESTS:    Health Issues:  Altered mental status  Altered mental state  Low grade fever  Chronic deep vein thrombosis (DVT) of tibial vein of right lower extremity  Chest mass  Essential hypertension  Intentional drug overdose, subsequent encounter  Encephalopathy acute  Barbital (barbitone) overdose, undetermined intent, initial encounter  Schizophrenia, unspecified type  Altered mental state      DVT/GI ppx  Discussed with pt @ bedside

## 2017-02-12 NOTE — PROGRESS NOTE ADULT - ASSESSMENT
56 y F hx schizophrenia, HTN, recently dx'd R breast CA s/p lumpectomy/chemo, adm for AMS, intentional drug OD.Brought to the ER, with stable BP, HR and comfortably breathing and protecting her airway.  Stat Head Ct was negative for acute CVA/bleed. Her initial labs and EKG were unremarkable, although her urine tox was + for barbituates. The alcohol/acetaminophen/salicylate levels were negative. There were was no fever and no menningeal signs. She was started on IV fluids and brought to the ICU for severe obtundation, with concerns for airway protection.  In reviewing her medication list, she was on multiple potentially sedating medications including Clozapine, Seroquel, and Lamicatal.  AMS resolved over 2 days. Back to her baslien. Per psych - lacks decision making capacity, needs inpt psych admission when medically stable.   Spiked low grade temp since last 5 days ,treated for UTI, urinary retention, resolved at present. Doppler showed Chronic LE DVT - started on Full AC. ID consulted for FUO, cultures all negative, procalcitonin negative. ESR - high. observing off antibiotics, had fever again today (low grade).Being anticoagulated . INR still subtherapeutic    A/P    >Encephalopathy: 2/2 impulsive ingestion - drug OD - resolved  appreciate psychiatry input, 1:1 dc'd. cont to monitor. EKG 02/02/2017- qtc 476 - improved     >Schizophrenia: f/u psych recs, further management as per psych - needs inpatient psych hospitalization - needs to be independent for that or else may need [facility].  pt lacks decision making capacity per psych    >Fever - blood c/s _ NG  Ct chest no infiltrate   blood c/s -neg 01/31/2017   chronic DVT - per Vasc sx - ct AC given her high risk.   could be sec to DVT  ID discontinued as its likely not sec to infection  Pt was afebrile yesterday but spiking low grade fever again today .Denies any specific symptoms as such  Will Monitor        Chronic DVT - on AC now  Discussed with brother Kim over phone - All risks and benefits oF AC discussed in details - agrees with AC at this time given benefits outweigh risks.   If continues to have Blood in stools , will need to dc anticoagulation and consider IVCf ilter? will discuss with vascular. Monitor CBC and stools  H.H stable though INR still low  give Coumadin 10 mg today  INR in AM  on therapeutic  lovenox.     >Urinary retention   resolved    >Hypokalemia - potassium supplement      >HTN: Stable   monitor BP, cont. to hold medication for now     >Lung mass on cxr  CT chest no lung mass noted, ? Hyperdense lesion in the right retroareolar breast with associated fat necrosis, will need outpatient f/u- mammogram     >DVT: lovenox

## 2017-02-13 VITALS
OXYGEN SATURATION: 96 % | TEMPERATURE: 98 F | DIASTOLIC BLOOD PRESSURE: 63 MMHG | RESPIRATION RATE: 18 BRPM | SYSTOLIC BLOOD PRESSURE: 104 MMHG | HEART RATE: 72 BPM

## 2017-02-13 LAB
ANA PAT FLD IF-IMP: ABNORMAL
ANA TITR SER: ABNORMAL
HCT VFR BLD CALC: 37.9 % — SIGNIFICANT CHANGE UP (ref 37–47)
HGB BLD-MCNC: 12.1 G/DL — SIGNIFICANT CHANGE UP (ref 12–16)
INR BLD: 1.45 RATIO — HIGH (ref 0.88–1.16)
MCHC RBC-ENTMCNC: 28.9 PG — SIGNIFICANT CHANGE UP (ref 27–31)
MCHC RBC-ENTMCNC: 31.9 G/DL — LOW (ref 32–36)
MCV RBC AUTO: 90.5 FL — SIGNIFICANT CHANGE UP (ref 81–99)
PLATELET # BLD AUTO: 231 K/UL — SIGNIFICANT CHANGE UP (ref 150–400)
PROTHROM AB SERPL-ACNC: 16 SEC — HIGH (ref 10–13.1)
RBC # BLD: 4.19 M/UL — LOW (ref 4.4–5.2)
RBC # FLD: 14.6 % — SIGNIFICANT CHANGE UP (ref 11–15.6)
WBC # BLD: 4.45 K/UL — LOW (ref 4.8–10.8)
WBC # FLD AUTO: 4.45 K/UL — LOW (ref 4.8–10.8)

## 2017-02-13 PROCEDURE — 71250 CT THORAX DX C-: CPT

## 2017-02-13 PROCEDURE — 87486 CHLMYD PNEUM DNA AMP PROBE: CPT

## 2017-02-13 PROCEDURE — 36415 COLL VENOUS BLD VENIPUNCTURE: CPT

## 2017-02-13 PROCEDURE — 95819 EEG AWAKE AND ASLEEP: CPT

## 2017-02-13 PROCEDURE — 84145 PROCALCITONIN (PCT): CPT

## 2017-02-13 PROCEDURE — 86140 C-REACTIVE PROTEIN: CPT

## 2017-02-13 PROCEDURE — 84484 ASSAY OF TROPONIN QUANT: CPT

## 2017-02-13 PROCEDURE — 93970 EXTREMITY STUDY: CPT

## 2017-02-13 PROCEDURE — 70450 CT HEAD/BRAIN W/O DYE: CPT

## 2017-02-13 PROCEDURE — 82330 ASSAY OF CALCIUM: CPT

## 2017-02-13 PROCEDURE — 84132 ASSAY OF SERUM POTASSIUM: CPT

## 2017-02-13 PROCEDURE — 80048 BASIC METABOLIC PNL TOTAL CA: CPT

## 2017-02-13 PROCEDURE — 71046 X-RAY EXAM CHEST 2 VIEWS: CPT

## 2017-02-13 PROCEDURE — 85014 HEMATOCRIT: CPT

## 2017-02-13 PROCEDURE — 71045 X-RAY EXAM CHEST 1 VIEW: CPT

## 2017-02-13 PROCEDURE — 97530 THERAPEUTIC ACTIVITIES: CPT

## 2017-02-13 PROCEDURE — 84295 ASSAY OF SERUM SODIUM: CPT

## 2017-02-13 PROCEDURE — 96374 THER/PROPH/DIAG INJ IV PUSH: CPT

## 2017-02-13 PROCEDURE — 85379 FIBRIN DEGRADATION QUANT: CPT

## 2017-02-13 PROCEDURE — 85610 PROTHROMBIN TIME: CPT

## 2017-02-13 PROCEDURE — 85027 COMPLETE CBC AUTOMATED: CPT

## 2017-02-13 PROCEDURE — 97163 PT EVAL HIGH COMPLEX 45 MIN: CPT

## 2017-02-13 PROCEDURE — 82435 ASSAY OF BLOOD CHLORIDE: CPT

## 2017-02-13 PROCEDURE — 81001 URINALYSIS AUTO W/SCOPE: CPT

## 2017-02-13 PROCEDURE — 86038 ANTINUCLEAR ANTIBODIES: CPT

## 2017-02-13 PROCEDURE — 82550 ASSAY OF CK (CPK): CPT

## 2017-02-13 PROCEDURE — 87798 DETECT AGENT NOS DNA AMP: CPT

## 2017-02-13 PROCEDURE — 87633 RESP VIRUS 12-25 TARGETS: CPT

## 2017-02-13 PROCEDURE — 99285 EMERGENCY DEPT VISIT HI MDM: CPT | Mod: 25

## 2017-02-13 PROCEDURE — 82947 ASSAY GLUCOSE BLOOD QUANT: CPT

## 2017-02-13 PROCEDURE — 99239 HOSP IP/OBS DSCHRG MGMT >30: CPT

## 2017-02-13 PROCEDURE — 82803 BLOOD GASES ANY COMBINATION: CPT

## 2017-02-13 PROCEDURE — 97116 GAIT TRAINING THERAPY: CPT

## 2017-02-13 PROCEDURE — 87086 URINE CULTURE/COLONY COUNT: CPT

## 2017-02-13 PROCEDURE — 72125 CT NECK SPINE W/O DYE: CPT

## 2017-02-13 PROCEDURE — 80053 COMPREHEN METABOLIC PANEL: CPT

## 2017-02-13 PROCEDURE — 87581 M.PNEUMON DNA AMP PROBE: CPT

## 2017-02-13 PROCEDURE — 87040 BLOOD CULTURE FOR BACTERIA: CPT

## 2017-02-13 PROCEDURE — 96372 THER/PROPH/DIAG INJ SC/IM: CPT | Mod: XU

## 2017-02-13 PROCEDURE — 83735 ASSAY OF MAGNESIUM: CPT

## 2017-02-13 PROCEDURE — 80307 DRUG TEST PRSMV CHEM ANLYZR: CPT

## 2017-02-13 PROCEDURE — 85652 RBC SED RATE AUTOMATED: CPT

## 2017-02-13 PROCEDURE — 82140 ASSAY OF AMMONIA: CPT

## 2017-02-13 PROCEDURE — 83605 ASSAY OF LACTIC ACID: CPT

## 2017-02-13 PROCEDURE — 93005 ELECTROCARDIOGRAM TRACING: CPT

## 2017-02-13 PROCEDURE — 82962 GLUCOSE BLOOD TEST: CPT

## 2017-02-13 PROCEDURE — 85730 THROMBOPLASTIN TIME PARTIAL: CPT

## 2017-02-13 RX ORDER — SENNA PLUS 8.6 MG/1
2 TABLET ORAL
Qty: 0 | Refills: 0 | COMMUNITY
Start: 2017-02-13

## 2017-02-13 RX ORDER — BENZTROPINE MESYLATE 1 MG
1 TABLET ORAL
Qty: 0 | Refills: 0 | COMMUNITY

## 2017-02-13 RX ORDER — METOPROLOL TARTRATE 50 MG
1 TABLET ORAL
Qty: 0 | Refills: 0 | COMMUNITY

## 2017-02-13 RX ORDER — CLOZAPINE 150 MG/1
50 TABLET, ORALLY DISINTEGRATING ORAL
Qty: 0 | Refills: 0 | COMMUNITY

## 2017-02-13 RX ORDER — PRIMIDONE 250 MG/1
1 TABLET ORAL
Qty: 0 | Refills: 0 | COMMUNITY

## 2017-02-13 RX ORDER — WARFARIN SODIUM 2.5 MG/1
1 TABLET ORAL
Qty: 0 | Refills: 0 | COMMUNITY

## 2017-02-13 RX ORDER — QUETIAPINE FUMARATE 200 MG/1
1 TABLET, FILM COATED ORAL
Qty: 0 | Refills: 0 | COMMUNITY

## 2017-02-13 RX ORDER — ENOXAPARIN SODIUM 100 MG/ML
113 INJECTION SUBCUTANEOUS
Qty: 0 | Refills: 0 | COMMUNITY
Start: 2017-02-13

## 2017-02-13 RX ORDER — CALCIUM POLYCARBOPHIL 625 MG/1
1 TABLET, FILM COATED ORAL
Qty: 0 | Refills: 0 | COMMUNITY

## 2017-02-13 RX ORDER — QUETIAPINE FUMARATE 200 MG/1
1 TABLET, FILM COATED ORAL
Qty: 0 | Refills: 0 | COMMUNITY
Start: 2017-02-13

## 2017-02-13 RX ORDER — SIMVASTATIN 20 MG/1
1 TABLET, FILM COATED ORAL
Qty: 0 | Refills: 0 | COMMUNITY

## 2017-02-13 RX ORDER — ASPIRIN/CALCIUM CARB/MAGNESIUM 324 MG
1 TABLET ORAL
Qty: 0 | Refills: 0 | COMMUNITY

## 2017-02-13 RX ORDER — POLYETHYLENE GLYCOL 3350 17 G/17G
17 POWDER, FOR SOLUTION ORAL
Qty: 0 | Refills: 0 | COMMUNITY
Start: 2017-02-13

## 2017-02-13 RX ORDER — DOCUSATE SODIUM 100 MG
1 CAPSULE ORAL
Qty: 0 | Refills: 0 | COMMUNITY
Start: 2017-02-13

## 2017-02-13 RX ORDER — WARFARIN SODIUM 2.5 MG/1
14 TABLET ORAL ONCE
Qty: 0 | Refills: 0 | Status: DISCONTINUED | OUTPATIENT
Start: 2017-02-13 | End: 2017-02-13

## 2017-02-13 RX ORDER — LAMOTRIGINE 25 MG/1
1 TABLET, ORALLY DISINTEGRATING ORAL
Qty: 0 | Refills: 0 | COMMUNITY

## 2017-02-13 RX ORDER — PANTOPRAZOLE SODIUM 20 MG/1
1 TABLET, DELAYED RELEASE ORAL
Qty: 0 | Refills: 0 | COMMUNITY
Start: 2017-02-13

## 2017-02-13 RX ORDER — ERGOCALCIFEROL 1.25 MG/1
1 CAPSULE ORAL
Qty: 0 | Refills: 0 | COMMUNITY

## 2017-02-13 RX ADMIN — Medication 100 MILLIGRAM(S): at 17:05

## 2017-02-13 RX ADMIN — ENOXAPARIN SODIUM 113 MILLIGRAM(S): 100 INJECTION SUBCUTANEOUS at 17:05

## 2017-02-13 RX ADMIN — QUETIAPINE FUMARATE 50 MILLIGRAM(S): 200 TABLET, FILM COATED ORAL at 05:08

## 2017-02-13 RX ADMIN — ENOXAPARIN SODIUM 113 MILLIGRAM(S): 100 INJECTION SUBCUTANEOUS at 05:08

## 2017-02-13 RX ADMIN — Medication 100 MILLIGRAM(S): at 05:08

## 2017-02-13 RX ADMIN — LAMOTRIGINE 150 MILLIGRAM(S): 25 TABLET, ORALLY DISINTEGRATING ORAL at 05:08

## 2017-02-13 RX ADMIN — PANTOPRAZOLE SODIUM 40 MILLIGRAM(S): 20 TABLET, DELAYED RELEASE ORAL at 05:08

## 2017-02-13 RX ADMIN — PRIMIDONE 50 MILLIGRAM(S): 250 TABLET ORAL at 05:08

## 2017-02-13 RX ADMIN — PRIMIDONE 50 MILLIGRAM(S): 250 TABLET ORAL at 17:06

## 2017-02-13 RX ADMIN — Medication 1 APPLICATION(S): at 12:22

## 2017-02-13 RX ADMIN — QUETIAPINE FUMARATE 50 MILLIGRAM(S): 200 TABLET, FILM COATED ORAL at 17:06

## 2017-02-13 RX ADMIN — LAMOTRIGINE 150 MILLIGRAM(S): 25 TABLET, ORALLY DISINTEGRATING ORAL at 17:05

## 2017-02-13 NOTE — DISCHARGE NOTE ADULT - CARE PROVIDER_API CALL
Socrates Goetz  EXT: [id number]    [facility]  Phone: [phone]  Fax: (   )    -    Dr. Bauman,   [id number]    [facility]  Phone: [phone]  Fax: (   )    -

## 2017-02-13 NOTE — PROGRESS NOTE ADULT - PROBLEM SELECTOR PLAN 3
psych following, can dc sitter
Coumadin with Lovenox bridge  INR goal 2-3, may dc lovenox when INR>2  H&H stable

## 2017-02-13 NOTE — PROGRESS NOTE ADULT - PROBLEM SELECTOR PROBLEM 3
Intentional drug overdose, subsequent encounter
Chronic deep vein thrombosis (DVT) of tibial vein of right lower extremity

## 2017-02-13 NOTE — DISCHARGE NOTE ADULT - MEDICATION SUMMARY - MEDICATIONS TO TAKE
I will START or STAY ON the medications listed below when I get home from the hospital:    warfarin 10 mg oral tablet  -- 1 tab(s) by mouth once a day  -- Indication: For Chronic deep vein thrombosis (DVT) of tibial vein of right lower extremity    enoxaparin  -- 113 milligram(s) subcutaneously every 12 hours  -- Indication: For Chronic deep vein thrombosis (DVT) of tibial vein of right lower extremity    lamoTRIgine 150 mg oral tablet  -- 1 tab(s) by mouth 2 times a day  -- Indication: For Schizophrenia, unspecified type    primidone 50 mg oral tablet  -- 1 tab(s) by mouth 2 times a day  -- Indication: For Schizophrenia, unspecified type    simvastatin 5 mg oral tablet  -- 1 tab(s) by mouth once a day (at bedtime)  -- Indication: For HLD    QUEtiapine 50 mg oral tablet  -- 1 tab(s) by mouth 2 times a day  -- Indication: For Schizophrenia, unspecified type    silver sulfADIAZINE 1% topical cream  -- 1 application on skin once a day to skin ulcer on ankle  -- Indication: For ulcer    senna oral tablet  -- 2 tab(s) by mouth once a day (at bedtime)  -- Indication: For Constipation    docusate sodium 100 mg oral capsule  -- 1 cap(s) by mouth 2 times a day  -- Indication: For Constipation    polyethylene glycol 3350 oral powder for reconstitution  -- 17 gram(s) by mouth once a day  -- Indication: For Constipation    pantoprazole 40 mg oral delayed release tablet  -- 1 tab(s) by mouth once a day (before a meal)  -- Indication: For GERD    ergocalciferol 50,000 intl units (1.25 mg) oral capsule  -- 1 cap(s) by mouth once every 2 week  -- Indication: For Vitamin d deficiency

## 2017-02-13 NOTE — PROGRESS NOTE ADULT - PROBLEM SELECTOR PLAN 5
will obtain CT scan and call radiation oncologist from [facility]
Hyperdense lesion in the right retroareolar breast with associated fat necrosis, will need outpatient f/u- mammogram

## 2017-02-13 NOTE — DISCHARGE NOTE ADULT - CARE PLAN
Principal Discharge DX:	Altered mental state  Goal:	The patient will remain stable  Instructions for follow-up, activity and diet:	R/T impulsive ingestion/drug overdose, resolved  Continue medications as prescribed  Secondary Diagnosis:	Encephalopathy acute  Instructions for follow-up, activity and diet:	2/2 impulsive ingestion - drug OD - resolved  followed by Psych, no longer requires in patient psych admission.  Secondary Diagnosis:	Intentional drug overdose, subsequent encounter  Instructions for follow-up, activity and diet:	as above  OK to dc to [facility]  Secondary Diagnosis:	Schizophrenia  Instructions for follow-up, activity and diet:	continue current RX as per Psych  Secondary Diagnosis:	Chest mass  Instructions for follow-up, activity and diet:	Hyperdense lesion in the right retroareolar breast with associated fat necrosis, will need outpatient f/u- mammogram.  Secondary Diagnosis:	Chronic deep vein thrombosis (DVT) of tibial vein of right lower extremity  Instructions for follow-up, activity and diet:	Continue Lovenox bridge to Coumadin  Daily INR, may stop Lovenox when INR > 2  Secondary Diagnosis:	UTI (urinary tract infection)  Instructions for follow-up, activity and diet:	treated

## 2017-02-13 NOTE — PROGRESS NOTE ADULT - SUBJECTIVE AND OBJECTIVE BOX
CC: Altered mental status (31 Jan 2017 11:26)    HPI:  56F with a history of schizoaffective disorder, who lives in a group home. Brought to the ER found having fallen out of bed and being unresponsive. Brought to the ER, with stable BP, HR and comfortably breathing and protecting her airway.  Stat Head Ct was negative for cute CVA/bleed. Her initial labs and EKG were unremarkable, although her urine tox was + for barbituates. The alcohol/acetaminophen/salicylate levels were negative. There were was no fever and no menningeal signs. She was started on IV fluids and brought to the ICU for severe obtundation, with concerns for airway protection.  In reviewing her medication list, she is currently on multiple potentially sedating medications including Clozapine, Seroquel, and Lamicatal. (31 Jan 2017 11:26)    INTERVAL HPI/OVERNIGHT EVENTS: No acute events    Vital Signs Last 24 Hrs  T(C): 37.1, Max: 37.9 (02-12 @ 16:51)  T(F): 98.7, Max: 100.3 (02-12 @ 16:51)  HR: 73 (55 - 73)  BP: 110/75 (97/53 - 119/77)  BP(mean): --  RR: 18 (16 - 18)  SpO2: 95% (95% - 95%)  I&O's Detail  I & Os for 24h ending 13 Feb 2017 07:00  =============================================  IN:    Oral Fluid: 300 ml    Total IN: 300 ml  ---------------------------------------------  OUT:    Total OUT: 0 ml  ---------------------------------------------  Total NET: 300 ml    I & Os for current day (as of 13 Feb 2017 12:41)  =============================================  IN:    Oral Fluid: 320 ml    Total IN: 320 ml  ---------------------------------------------  OUT:    Total OUT: 0 ml  ---------------------------------------------  Total NET: 320 ml                                12.1   4.45  )-----------( 231      ( 13 Feb 2017 05:26 )             37.9           PT/INR - ( 13 Feb 2017 05:26 )   PT: 16.0 sec;   INR: 1.45 ratio           CAPILLARY BLOOD GLUCOSE          MEDICATIONS  (STANDING):  pantoprazole    Tablet 40milliGRAM(s) Oral before breakfast  primidone 50milliGRAM(s) Oral two times a day  lamoTRIgine 150milliGRAM(s) Oral two times a day  silver sulfADIAZINE 1% Cream 1Application(s) Topical daily  enoxaparin Injectable 113milliGRAM(s) SubCutaneous every 12 hours  docusate sodium 100milliGRAM(s) Oral two times a day  senna 2Tablet(s) Oral at bedtime  polyethylene glycol 3350 17Gram(s) Oral daily  QUEtiapine 50milliGRAM(s) Oral two times a day  ondansetron Injectable 4milliGRAM(s) IV Push once  warfarin 14milliGRAM(s) Oral once    MEDICATIONS  (PRN):  acetaminophen   Tablet 650milliGRAM(s) Oral every 6 hours PRN For Temp greater than 38 C (100.4 F)      RADIOLOGY & ADDITIONAL TESTS:    ROS: Constitutional, Eyes, Cardiovascular, Respiratory,  ENT, Gastrointestinal, Genitourinary, Musculoskeletal, Integumentary, Neurological,  Psychiatric, Endocrine, Heme/Lymph are otherwise negative.

## 2017-02-13 NOTE — PROGRESS NOTE ADULT - ASSESSMENT
56 y F hx schizophrenia, HTN, recently dx'd R breast CA s/p lumpectomy/chemo, adm for AMS, intentional drug OD.Brought to the ER, with stable BP, HR and comfortably breathing and protecting her airway.  Stat Head Ct was negative for acute CVA/bleed. Her initial labs and EKG were unremarkable, although her urine tox was + for barbituates. The alcohol/acetaminophen/salicylate levels were negative. There were was no fever and no menningeal signs. She was started on IV fluids and brought to the ICU for severe obtundation, with concerns for airway protection.  In reviewing her medication list, she was on multiple potentially sedating medications including Clozapine, Seroquel, and Lamicatal.  AMS resolved over 2 days. Back to her baseline.  Assessed by psych - lacks decision making capacity,initially recommended  inpt psych admission when medically stable.   Spiked low grade temp since last 5 days ,treated for UTI, urinary retention, resolved.  Doppler showed Chronic LE DVT - started on Full AC. ID consulted for FUO, cultures all negative, procalcitonin negative. ESR - high. observing off antibiotics, had fever again today (low grade).Being anticoagulated . INR still subtherapeutic. Reassessed by Psych, no longer needs inpatient and can be discharged to [facility].

## 2017-02-13 NOTE — DISCHARGE NOTE ADULT - OTHER SIGNIFICANT FINDINGS
EXAM:  CT CHEST                          PROCEDURE DATE:  02/02/2017        INTERPRETATION:  Clinical information: Possible right central lung mass   on x-ray    Comparison: Radiograph same day    PROCEDURE:   CT of the Chest was performed withoutintravenous contrast.    FINDINGS:    CHEST:    CHEST WALL AND LOWER NECK: Hyperdense lesion in the right retroareolar   breast with associated fat necrosis, possibly related to prior   instrumentation, however recommend correlation with prior breastimaging   to exclude underlying mass.  MEDIASTINUM AND CAT: No intrathoracic adenopathy, no fluid collection.  HEART: Within normal limits  VESSELS: Unopacified great vessels are unremarkable.  LUNG AND LARGE AIRWAYS: No mass, consolidation. Trace pleural effusions.    UPPER ABDOMEN: Within normal limits    MUSCULOSKELETAL: Within normal limits    IMPRESSION:     No lung mass or other acute findings. Incidental finding right breast   with recommendations as above.

## 2017-02-13 NOTE — DISCHARGE NOTE ADULT - MEDICATION SUMMARY - MEDICATIONS TO STOP TAKING
I will STOP taking the medications listed below when I get home from the hospital:    cloZAPine  --  by mouth    metoprolol  --  by mouth    aspirin  --  by mouth    Motrin  --  by mouth

## 2017-02-13 NOTE — PROGRESS NOTE ADULT - PROBLEM SELECTOR PLAN 1
improved stable to transfer to floor Dr. Phillips accepted
2/2 impulsive ingestion - drug OD - resolved  followed by Psych, no longer requires in patient psych admission

## 2017-02-13 NOTE — DISCHARGE NOTE ADULT - SECONDARY DIAGNOSIS.
Encephalopathy acute Intentional drug overdose, subsequent encounter Schizophrenia Chest mass Chronic deep vein thrombosis (DVT) of tibial vein of right lower extremity UTI (urinary tract infection)

## 2017-02-13 NOTE — DISCHARGE NOTE ADULT - PROVIDER TOKENS
FREE:[LAST:[Bishnu],FIRST:[Socrates],PHONE:[phone]],FAX:[(   )    -],ADDRESS:[EXT: [id number]    [facility]FREE:[LAST:[Dr. Bauman],PHONE:[phone]],FAX:[(   )    -],ADDRESS:[EXT:[id number]    [facility]]]

## 2017-02-13 NOTE — DISCHARGE NOTE ADULT - PATIENT PORTAL LINK FT
“You can access the [facility] Patient Portal, offered by [facility], by registering with the following website: [email or web address]”

## 2017-02-13 NOTE — DISCHARGE NOTE ADULT - PLAN OF CARE
The patient will remain stable R/T impulsive ingestion/drug overdose, resolved  Continue medications as prescribed 2/2 impulsive ingestion - drug OD - resolved  followed by Psych, no longer requires in patient psych admission. as above  OK to dc to [facility] continue current RX as per Psych Hyperdense lesion in the right retroareolar breast with associated fat necrosis, will need outpatient f/u- mammogram. Continue Lovenox bridge to Coumadin  Daily INR, may stop Lovenox when INR > 2 treated

## 2017-02-13 NOTE — DISCHARGE NOTE ADULT - MEDICATION SUMMARY - MEDICATIONS TO CHANGE
I will SWITCH the dose or number of times a day I take the medications listed below when I get home from the hospital:    SEROquel  --  by mouth

## 2017-02-13 NOTE — DISCHARGE NOTE ADULT - HOSPITAL COURSE
56 y F hx schizophrenia, HTN, recently dx'd R breast CA s/p lumpectomy/chemo, adm for AMS, intentional drug OD. Brought to the ER, with stable BP, HR and comfortably breathing and protecting her airway.  Stat Head Ct was negative for acute CVA/bleed. Her initial labs and EKG were unremarkable, although her urine tox was + for barbituates. The alcohol/acetaminophen/salicylate levels were negative. There were was no fever and no menningeal signs. She was started on IV fluids and brought to the ICU for severe obtundation, with concerns for airway protection.  In reviewing her medication list, she was on multiple potentially sedating medications including Clozapine, Seroquel, and Lamicatal.  AMS resolved over 2 days. Back to her baseline.  Assessed by psych - lacks decision making capacity, and initially recommended  inpt psych admission when medically stable.  The patient spiked low grade temp and was treated for UTI, urinary retention, which resolved.  Doppler showed Chronic LE DVT - started on Full AC. ID consulted for FUO, cultures all negative, procalcitonin negative. ESR - high. observing off antibiotics, had recurrent fever possibly R/T DVT. No signs or symptoms of active infection. .Being anticoagulated . INR still subtherapeutic. Reassessed by Psych, no longer needs inpatient and can be discharged to [facility]. The patient will need Lovenox bridge until INR is > 2.0.

## 2017-10-03 ENCOUNTER — EMERGENCY (EMERGENCY)
Facility: HOSPITAL | Age: 57
LOS: 1 days | Discharge: HOME | End: 2017-10-03
Attending: EMERGENCY MEDICINE
Payer: MEDICARE

## 2017-10-03 VITALS
SYSTOLIC BLOOD PRESSURE: 120 MMHG | RESPIRATION RATE: 20 BRPM | DIASTOLIC BLOOD PRESSURE: 83 MMHG | OXYGEN SATURATION: 95 % | HEART RATE: 84 BPM | TEMPERATURE: 98 F

## 2017-10-03 LAB
ALBUMIN SERPL ELPH-MCNC: 3.5 G/DL — SIGNIFICANT CHANGE UP (ref 3.3–5.2)
ALP SERPL-CCNC: 92 U/L — SIGNIFICANT CHANGE UP (ref 40–120)
ALT FLD-CCNC: 17 U/L — SIGNIFICANT CHANGE UP
ANION GAP SERPL CALC-SCNC: 11 MMOL/L — SIGNIFICANT CHANGE UP (ref 5–17)
APTT BLD: 39.8 SEC — HIGH (ref 27.5–37.4)
AST SERPL-CCNC: 23 U/L — SIGNIFICANT CHANGE UP
BILIRUB SERPL-MCNC: 0.3 MG/DL — LOW (ref 0.4–2)
BUN SERPL-MCNC: 22 MG/DL — HIGH (ref 8–20)
CALCIUM SERPL-MCNC: 8.7 MG/DL — SIGNIFICANT CHANGE UP (ref 8.6–10.2)
CHLORIDE SERPL-SCNC: 103 MMOL/L — SIGNIFICANT CHANGE UP (ref 98–107)
CO2 SERPL-SCNC: 30 MMOL/L — HIGH (ref 22–29)
CREAT SERPL-MCNC: 0.77 MG/DL — SIGNIFICANT CHANGE UP (ref 0.5–1.3)
GLUCOSE SERPL-MCNC: 105 MG/DL — SIGNIFICANT CHANGE UP (ref 70–115)
HCT VFR BLD CALC: 43.6 % — SIGNIFICANT CHANGE UP (ref 37–47)
HGB BLD-MCNC: 13.8 G/DL — SIGNIFICANT CHANGE UP (ref 12–16)
INR BLD: 2.44 RATIO — HIGH (ref 0.88–1.16)
LYMPHOCYTES # BLD AUTO: 24 % — SIGNIFICANT CHANGE UP (ref 20–55)
MCHC RBC-ENTMCNC: 29.6 PG — SIGNIFICANT CHANGE UP (ref 27–31)
MCHC RBC-ENTMCNC: 31.7 G/DL — LOW (ref 32–36)
MCV RBC AUTO: 93.6 FL — SIGNIFICANT CHANGE UP (ref 81–99)
MONOCYTES NFR BLD AUTO: 18 % — HIGH (ref 3–10)
NEUTROPHILS NFR BLD AUTO: 56 % — SIGNIFICANT CHANGE UP (ref 37–73)
NEUTS BAND # BLD: 2 % — SIGNIFICANT CHANGE UP (ref 0–8)
PLAT MORPH BLD: NORMAL — SIGNIFICANT CHANGE UP
PLATELET # BLD AUTO: 117 K/UL — LOW (ref 150–400)
POTASSIUM SERPL-MCNC: 4.5 MMOL/L — SIGNIFICANT CHANGE UP (ref 3.5–5.3)
POTASSIUM SERPL-SCNC: 4.5 MMOL/L — SIGNIFICANT CHANGE UP (ref 3.5–5.3)
PROT SERPL-MCNC: 6.4 G/DL — LOW (ref 6.6–8.7)
PROTHROM AB SERPL-ACNC: 27.3 SEC — HIGH (ref 9.8–12.7)
RBC # BLD: 4.66 M/UL — SIGNIFICANT CHANGE UP (ref 4.4–5.2)
RBC # FLD: 14.9 % — SIGNIFICANT CHANGE UP (ref 11–15.6)
RBC BLD AUTO: NORMAL — SIGNIFICANT CHANGE UP
SODIUM SERPL-SCNC: 144 MMOL/L — SIGNIFICANT CHANGE UP (ref 135–145)
TROPONIN T SERPL-MCNC: <0.01 NG/ML — SIGNIFICANT CHANGE UP (ref 0–0.06)
WBC # BLD: 3.4 K/UL — LOW (ref 4.8–10.8)
WBC # FLD AUTO: 3.4 K/UL — LOW (ref 4.8–10.8)

## 2017-10-03 PROCEDURE — 93010 ELECTROCARDIOGRAM REPORT: CPT

## 2017-10-03 PROCEDURE — 71020: CPT | Mod: 26

## 2017-10-03 PROCEDURE — 99285 EMERGENCY DEPT VISIT HI MDM: CPT

## 2017-10-03 RX ORDER — SODIUM CHLORIDE 9 MG/ML
1000 INJECTION INTRAMUSCULAR; INTRAVENOUS; SUBCUTANEOUS ONCE
Qty: 0 | Refills: 0 | Status: COMPLETED | OUTPATIENT
Start: 2017-10-03 | End: 2017-10-03

## 2017-10-03 RX ADMIN — SODIUM CHLORIDE 1000 MILLILITER(S): 9 INJECTION INTRAMUSCULAR; INTRAVENOUS; SUBCUTANEOUS at 23:54

## 2017-10-03 NOTE — ED PROVIDER NOTE - CARE PLAN
Principal Discharge DX:	Dehydration  Secondary Diagnosis:	Urinary tract infection without hematuria, site unspecified

## 2017-10-03 NOTE — ED PROVIDER NOTE - OBJECTIVE STATEMENT
57 year old female with a h/o psychiatric problems living in a group facility presents with generalized weakness. Pt states that her meds have been changed several times in the last few months and today she had a hard time getting up

## 2017-10-03 NOTE — ED ADULT NURSE NOTE - OBJECTIVE STATEMENT
rcd pt in no apparent distress, a/ox3, lethargic, states she has been having difficulty walking due to b/l leg pain and weakness pt also states she has been feeling feverish, pt is from [facility] possible name is Maureen Joshi

## 2017-10-03 NOTE — ED PROVIDER NOTE - PROGRESS NOTE DETAILS
pt was found to be dehydrated with a uti and was given fluids and antibiotics and will be discharged

## 2017-10-04 VITALS
OXYGEN SATURATION: 97 % | SYSTOLIC BLOOD PRESSURE: 116 MMHG | HEART RATE: 82 BPM | TEMPERATURE: 99 F | RESPIRATION RATE: 20 BRPM | DIASTOLIC BLOOD PRESSURE: 80 MMHG

## 2017-10-04 LAB
APPEARANCE UR: CLEAR — SIGNIFICANT CHANGE UP
BILIRUB UR-MCNC: NEGATIVE — SIGNIFICANT CHANGE UP
COLOR SPEC: YELLOW — SIGNIFICANT CHANGE UP
DIFF PNL FLD: NEGATIVE — SIGNIFICANT CHANGE UP
EPI CELLS # UR: SIGNIFICANT CHANGE UP
GLUCOSE UR QL: NEGATIVE MG/DL — SIGNIFICANT CHANGE UP
KETONES UR-MCNC: ABNORMAL
LEUKOCYTE ESTERASE UR-ACNC: ABNORMAL
NITRITE UR-MCNC: NEGATIVE — SIGNIFICANT CHANGE UP
PH UR: 7 — SIGNIFICANT CHANGE UP (ref 5–8)
PROT UR-MCNC: NEGATIVE MG/DL — SIGNIFICANT CHANGE UP
RBC CASTS # UR COMP ASSIST: SIGNIFICANT CHANGE UP /HPF (ref 0–4)
SP GR SPEC: 1.01 — SIGNIFICANT CHANGE UP (ref 1.01–1.02)
UROBILINOGEN FLD QL: NEGATIVE MG/DL — SIGNIFICANT CHANGE UP
WBC UR QL: SIGNIFICANT CHANGE UP

## 2017-10-04 PROCEDURE — 85027 COMPLETE CBC AUTOMATED: CPT

## 2017-10-04 PROCEDURE — 87086 URINE CULTURE/COLONY COUNT: CPT

## 2017-10-04 PROCEDURE — 85730 THROMBOPLASTIN TIME PARTIAL: CPT

## 2017-10-04 PROCEDURE — 80053 COMPREHEN METABOLIC PANEL: CPT

## 2017-10-04 PROCEDURE — 93005 ELECTROCARDIOGRAM TRACING: CPT

## 2017-10-04 PROCEDURE — 71046 X-RAY EXAM CHEST 2 VIEWS: CPT

## 2017-10-04 PROCEDURE — 85610 PROTHROMBIN TIME: CPT

## 2017-10-04 PROCEDURE — 81001 URINALYSIS AUTO W/SCOPE: CPT

## 2017-10-04 PROCEDURE — 84484 ASSAY OF TROPONIN QUANT: CPT

## 2017-10-04 PROCEDURE — 36415 COLL VENOUS BLD VENIPUNCTURE: CPT

## 2017-10-04 PROCEDURE — 99284 EMERGENCY DEPT VISIT MOD MDM: CPT | Mod: 25

## 2017-10-04 RX ORDER — CEPHALEXIN 500 MG
1 CAPSULE ORAL
Qty: 20 | Refills: 0 | OUTPATIENT
Start: 2017-10-04 | End: 2017-10-14

## 2017-10-04 RX ORDER — CEPHALEXIN 500 MG
500 CAPSULE ORAL
Qty: 0 | Refills: 0 | Status: DISCONTINUED | OUTPATIENT
Start: 2017-10-04 | End: 2017-10-07

## 2017-10-04 RX ADMIN — Medication 500 MILLIGRAM(S): at 02:01

## 2017-10-05 LAB
CULTURE RESULTS: SIGNIFICANT CHANGE UP
SPECIMEN SOURCE: SIGNIFICANT CHANGE UP

## 2018-01-18 PROBLEM — Z00.00 ENCOUNTER FOR PREVENTIVE HEALTH EXAMINATION: Status: ACTIVE | Noted: 2018-01-18

## 2018-01-24 ENCOUNTER — APPOINTMENT (OUTPATIENT)
Dept: ORTHOPEDIC SURGERY | Facility: CLINIC | Age: 58
End: 2018-01-24
Payer: MEDICARE

## 2018-01-24 VITALS
BODY MASS INDEX: 39.08 KG/M2 | SYSTOLIC BLOOD PRESSURE: 144 MMHG | DIASTOLIC BLOOD PRESSURE: 78 MMHG | HEART RATE: 68 BPM | WEIGHT: 207 LBS | HEIGHT: 61 IN

## 2018-01-24 DIAGNOSIS — Z82.61 FAMILY HISTORY OF ARTHRITIS: ICD-10-CM

## 2018-01-24 DIAGNOSIS — M17.0 BILATERAL PRIMARY OSTEOARTHRITIS OF KNEE: ICD-10-CM

## 2018-01-24 DIAGNOSIS — Z82.62 FAMILY HISTORY OF OSTEOPOROSIS: ICD-10-CM

## 2018-01-24 DIAGNOSIS — Z86.39 PERSONAL HISTORY OF OTHER ENDOCRINE, NUTRITIONAL AND METABOLIC DISEASE: ICD-10-CM

## 2018-01-24 DIAGNOSIS — Z84.89 FAMILY HISTORY OF OTHER SPECIFIED CONDITIONS: ICD-10-CM

## 2018-01-24 DIAGNOSIS — Z80.9 FAMILY HISTORY OF MALIGNANT NEOPLASM, UNSPECIFIED: ICD-10-CM

## 2018-01-24 DIAGNOSIS — Z85.9 PERSONAL HISTORY OF MALIGNANT NEOPLASM, UNSPECIFIED: ICD-10-CM

## 2018-01-24 DIAGNOSIS — M17.10 UNILATERAL PRIMARY OSTEOARTHRITIS, UNSPECIFIED KNEE: ICD-10-CM

## 2018-01-24 PROCEDURE — 73562 X-RAY EXAM OF KNEE 3: CPT | Mod: 50

## 2018-01-24 PROCEDURE — 99204 OFFICE O/P NEW MOD 45 MIN: CPT

## 2018-01-26 ENCOUNTER — RX RENEWAL (OUTPATIENT)
Age: 58
End: 2018-01-26

## 2018-04-02 ENCOUNTER — EMERGENCY (EMERGENCY)
Facility: HOSPITAL | Age: 58
LOS: 1 days | Discharge: HOME | End: 2018-04-02
Attending: EMERGENCY MEDICINE | Admitting: EMERGENCY MEDICINE
Payer: MEDICARE

## 2018-04-02 VITALS
DIASTOLIC BLOOD PRESSURE: 84 MMHG | TEMPERATURE: 98 F | WEIGHT: 209 LBS | OXYGEN SATURATION: 97 % | RESPIRATION RATE: 18 BRPM | HEART RATE: 83 BPM | SYSTOLIC BLOOD PRESSURE: 120 MMHG | HEIGHT: 61 IN

## 2018-04-02 LAB
HCT VFR BLD CALC: 38.5 % — SIGNIFICANT CHANGE UP (ref 37–47)
HGB BLD-MCNC: 12.4 G/DL — SIGNIFICANT CHANGE UP (ref 12–16)
MCHC RBC-ENTMCNC: 29.7 PG — SIGNIFICANT CHANGE UP (ref 27–31)
MCHC RBC-ENTMCNC: 32.2 G/DL — SIGNIFICANT CHANGE UP (ref 32–36)
MCV RBC AUTO: 92.3 FL — SIGNIFICANT CHANGE UP (ref 81–99)
PLATELET # BLD AUTO: 198 K/UL — SIGNIFICANT CHANGE UP (ref 150–400)
RBC # BLD: 4.17 M/UL — LOW (ref 4.4–5.2)
RBC # FLD: 14.5 % — SIGNIFICANT CHANGE UP (ref 11–15.6)
WBC # BLD: 6.7 K/UL — SIGNIFICANT CHANGE UP (ref 4.8–10.8)
WBC # FLD AUTO: 6.7 K/UL — SIGNIFICANT CHANGE UP (ref 4.8–10.8)

## 2018-04-02 PROCEDURE — 99284 EMERGENCY DEPT VISIT MOD MDM: CPT

## 2018-04-02 NOTE — ED ADULT NURSE NOTE - CHIEF COMPLAINT QUOTE
patient kennedy from [facility] with complaints of pain to bilateral leg pain and increasing weakness which started at an unknown time according to EMS, patient states that she has pain going down both of her legs, patient states that the pain is intermittent and hasn't been taking anything for it, patient states that the weakness has been increasing since December

## 2018-04-02 NOTE — ED ADULT NURSE NOTE - OBJECTIVE STATEMENT
pt care assumed at 2310, no apparent distress noted at this time. pt BIBA from [facility]. pt received A&Ox1 resting in bed comfortably. pt c/o pain above the knees b/l. pt is unable to tell when the pain started. HR is regular, lung sounds are clear b/l, abd is soft and nontender with positive bowel sounds in all four quadrants, skin is warm, dry and appropriate for age and race. plan of care explained, will continue to monitor.

## 2018-04-02 NOTE — ED ADULT TRIAGE NOTE - CHIEF COMPLAINT QUOTE
patient kennedy from [facility] with complaints of pain to bilateral leg pain and increasing weakness which started at an unknown time according to EMS, patient states that she has pain going down both of her legs, patient states that the pain is intermittent and hasn't been taking anything for it, patient states that the weakness has been increasing since December

## 2018-04-03 VITALS
DIASTOLIC BLOOD PRESSURE: 103 MMHG | RESPIRATION RATE: 20 BRPM | TEMPERATURE: 97 F | OXYGEN SATURATION: 98 % | HEART RATE: 91 BPM | SYSTOLIC BLOOD PRESSURE: 167 MMHG

## 2018-04-03 LAB
ALBUMIN SERPL ELPH-MCNC: 3.2 G/DL — LOW (ref 3.3–5.2)
ALP SERPL-CCNC: 76 U/L — SIGNIFICANT CHANGE UP (ref 40–120)
ALT FLD-CCNC: 8 U/L — SIGNIFICANT CHANGE UP
ANION GAP SERPL CALC-SCNC: 12 MMOL/L — SIGNIFICANT CHANGE UP (ref 5–17)
APAP SERPL-MCNC: <7.5 UG/ML — LOW (ref 10–26)
AST SERPL-CCNC: 15 U/L — SIGNIFICANT CHANGE UP
BASOPHILS # BLD AUTO: 0 K/UL — SIGNIFICANT CHANGE UP (ref 0–0.2)
BASOPHILS NFR BLD AUTO: 0.3 % — SIGNIFICANT CHANGE UP (ref 0–2)
BILIRUB SERPL-MCNC: 0.2 MG/DL — LOW (ref 0.4–2)
BUN SERPL-MCNC: 19 MG/DL — SIGNIFICANT CHANGE UP (ref 8–20)
CALCIUM SERPL-MCNC: 9.1 MG/DL — SIGNIFICANT CHANGE UP (ref 8.6–10.2)
CHLORIDE SERPL-SCNC: 102 MMOL/L — SIGNIFICANT CHANGE UP (ref 98–107)
CK SERPL-CCNC: 55 U/L — SIGNIFICANT CHANGE UP (ref 25–170)
CO2 SERPL-SCNC: 28 MMOL/L — SIGNIFICANT CHANGE UP (ref 22–29)
CREAT SERPL-MCNC: 0.58 MG/DL — SIGNIFICANT CHANGE UP (ref 0.5–1.3)
EOSINOPHIL # BLD AUTO: 0 K/UL — SIGNIFICANT CHANGE UP (ref 0–0.5)
EOSINOPHIL NFR BLD AUTO: 0 % — SIGNIFICANT CHANGE UP (ref 0–6)
ETHANOL SERPL-MCNC: <10 MG/DL — SIGNIFICANT CHANGE UP
GLUCOSE SERPL-MCNC: 95 MG/DL — SIGNIFICANT CHANGE UP (ref 70–115)
LYMPHOCYTES # BLD AUTO: 1.8 K/UL — SIGNIFICANT CHANGE UP (ref 1–4.8)
LYMPHOCYTES # BLD AUTO: 26.4 % — SIGNIFICANT CHANGE UP (ref 20–55)
MONOCYTES # BLD AUTO: 1.1 K/UL — HIGH (ref 0–0.8)
MONOCYTES NFR BLD AUTO: 16.3 % — HIGH (ref 3–10)
NEUTROPHILS # BLD AUTO: 3.8 K/UL — SIGNIFICANT CHANGE UP (ref 1.8–8)
NEUTROPHILS NFR BLD AUTO: 56.9 % — SIGNIFICANT CHANGE UP (ref 37–73)
PLAT MORPH BLD: NORMAL — SIGNIFICANT CHANGE UP
POTASSIUM SERPL-MCNC: 4.4 MMOL/L — SIGNIFICANT CHANGE UP (ref 3.5–5.3)
POTASSIUM SERPL-SCNC: 4.4 MMOL/L — SIGNIFICANT CHANGE UP (ref 3.5–5.3)
PROT SERPL-MCNC: 6.3 G/DL — LOW (ref 6.6–8.7)
RBC BLD AUTO: NORMAL — SIGNIFICANT CHANGE UP
SALICYLATES SERPL-MCNC: <0.6 MG/DL — LOW (ref 10–20)
SODIUM SERPL-SCNC: 142 MMOL/L — SIGNIFICANT CHANGE UP (ref 135–145)

## 2018-04-03 PROCEDURE — 82550 ASSAY OF CK (CPK): CPT

## 2018-04-03 PROCEDURE — 80053 COMPREHEN METABOLIC PANEL: CPT

## 2018-04-03 PROCEDURE — 80307 DRUG TEST PRSMV CHEM ANLYZR: CPT

## 2018-04-03 PROCEDURE — 36415 COLL VENOUS BLD VENIPUNCTURE: CPT

## 2018-04-03 PROCEDURE — 84443 ASSAY THYROID STIM HORMONE: CPT

## 2018-04-03 PROCEDURE — 85027 COMPLETE CBC AUTOMATED: CPT

## 2018-04-03 PROCEDURE — 99283 EMERGENCY DEPT VISIT LOW MDM: CPT

## 2018-04-03 NOTE — ED PROVIDER NOTE - MEDICAL DECISION MAKING DETAILS
chroni LE weakness with no sigsn cord compression rhado of infection nml reflexes nml exam return to ed for intractable HA, persistent vomiting, or new onset motor/sensory deficits

## 2018-04-03 NOTE — ED PROVIDER NOTE - OBJECTIVE STATEMENT
pt presents with chronic lower extremeity weakness no new traum no bowel or bladder incontinence/retention no new medications no si or hi no halluciantions. denies fever. denies HA or neck pain. no chest pain or sob. no abd pain. no n/v/d. no urinary f/u/d. no back pain. no motor or sensory deficits. denies illicit drug use. no recent travel. no rash. no other acute issues symptoms or concerns

## 2018-04-03 NOTE — ED ADULT NURSE REASSESSMENT NOTE - NS ED NURSE REASSESS COMMENT FT1
Dr. Sosa called to bedside for a r/o stroke. no code stroke warranted
Pt is resting in bed comfortably at this time, no apparent distress noted at this time. pt safety maintained. Pt denies any complaints at this time. Plan of care explained, will continue to monitor.

## 2018-04-03 NOTE — ED PROVIDER NOTE - PHYSICAL EXAMINATION
neuro: CN II - XII intact, EOMI, PERRL, no papilledema, 5/5 muscle strength x 4 extremities, no sensory deficits, 2+ dtr globally, negative babinski, no ataxic gait, normal SIERRA and FNT, normal romberg

## 2020-07-24 NOTE — ED ADULT NURSE NOTE - NS ED NURSE DISCH DISPOSITION
A message was left requesting a call back to the office to schedule patient for a   September follow up with provider 
Discharged

## 2021-04-06 NOTE — ED ADULT NURSE NOTE - NS ED NOTE  TALK SOMEONE YN
No Colchicine Pregnancy And Lactation Text: This medication is Pregnancy Category C and isn't considered safe during pregnancy. It is excreted in breast milk.

## 2023-11-14 ENCOUNTER — OFFICE (OUTPATIENT)
Dept: URBAN - METROPOLITAN AREA CLINIC 104 | Facility: CLINIC | Age: 63
Setting detail: OPHTHALMOLOGY
End: 2023-11-14
Payer: MEDICARE

## 2023-11-14 DIAGNOSIS — H25.12: ICD-10-CM

## 2023-11-14 DIAGNOSIS — H25.13: ICD-10-CM

## 2023-11-14 PROBLEM — H25.11 CATARACT SENILE NUCLEAR SCLEROSIS; RIGHT EYE, LEFT EYE, BOTH EYES: Status: ACTIVE | Noted: 2023-11-14

## 2023-11-14 PROCEDURE — 92014 COMPRE OPH EXAM EST PT 1/>: CPT | Performed by: SPECIALIST

## 2023-11-14 PROCEDURE — 92136 OPHTHALMIC BIOMETRY: CPT | Mod: TC | Performed by: SPECIALIST

## 2023-11-14 PROCEDURE — 92136 OPHTHALMIC BIOMETRY: CPT | Mod: LT | Performed by: SPECIALIST

## 2023-11-14 ASSESSMENT — REFRACTION_AUTOREFRACTION
OS_SPHERE: UNABLE
OD_SPHERE: UNABLE

## 2023-11-14 ASSESSMENT — CONFRONTATIONAL VISUAL FIELD TEST (CVF)
OS_FINDINGS: FULL
OD_FINDINGS: FULL

## 2023-11-14 ASSESSMENT — REFRACTION_MANIFEST
OD_SPHERE: UNABLE
OS_SPHERE: UNABLE

## 2023-12-22 ENCOUNTER — RX ONLY (RX ONLY)
Age: 63
End: 2023-12-22

## 2023-12-22 ENCOUNTER — ASC (OUTPATIENT)
Dept: URBAN - METROPOLITAN AREA SURGERY 8 | Facility: SURGERY | Age: 63
Setting detail: OPHTHALMOLOGY
End: 2023-12-22
Payer: MEDICARE

## 2023-12-22 DIAGNOSIS — H25.89: ICD-10-CM

## 2023-12-22 DIAGNOSIS — H25.12: ICD-10-CM

## 2023-12-22 PROCEDURE — 66982 XCAPSL CTRC RMVL CPLX WO ECP: CPT | Mod: LT | Performed by: SPECIALIST

## 2023-12-23 ENCOUNTER — OFFICE (OUTPATIENT)
Dept: URBAN - METROPOLITAN AREA CLINIC 104 | Facility: CLINIC | Age: 63
Setting detail: OPHTHALMOLOGY
End: 2023-12-23
Payer: MEDICARE

## 2023-12-23 DIAGNOSIS — Z96.1: ICD-10-CM

## 2023-12-23 PROCEDURE — 99024 POSTOP FOLLOW-UP VISIT: CPT | Performed by: OPTOMETRIST

## 2023-12-23 ASSESSMENT — CONFRONTATIONAL VISUAL FIELD TEST (CVF)
OS_FINDINGS: FULL
OD_FINDINGS: FULL

## 2023-12-23 ASSESSMENT — CORNEAL EDEMA - MICROCYSTIC EPITHELIAL EDEMA (MCE): OS_MCE: 3+ 4+

## 2023-12-23 ASSESSMENT — REFRACTION_AUTOREFRACTION
OS_SPHERE: UNABLE
OD_SPHERE: UNABLE

## 2023-12-23 ASSESSMENT — REFRACTION_MANIFEST
OS_SPHERE: UNABLE
OD_SPHERE: UNABLE

## 2023-12-23 ASSESSMENT — CORNEAL EDEMA - FOLDS/STRIAE: OS_FOLDSSTRIAE: 3+ 4+

## 2023-12-26 ENCOUNTER — RX ONLY (RX ONLY)
Age: 63
End: 2023-12-26

## 2023-12-29 ENCOUNTER — ASC (OUTPATIENT)
Dept: URBAN - METROPOLITAN AREA SURGERY 8 | Facility: SURGERY | Age: 63
Setting detail: OPHTHALMOLOGY
End: 2023-12-29
Payer: MEDICARE

## 2023-12-29 DIAGNOSIS — H25.89: ICD-10-CM

## 2023-12-29 DIAGNOSIS — H25.11: ICD-10-CM

## 2023-12-29 PROCEDURE — 66982 XCAPSL CTRC RMVL CPLX WO ECP: CPT | Mod: 79,RT | Performed by: SPECIALIST

## 2023-12-30 ENCOUNTER — OFFICE (OUTPATIENT)
Dept: URBAN - METROPOLITAN AREA CLINIC 104 | Facility: CLINIC | Age: 63
Setting detail: OPHTHALMOLOGY
End: 2023-12-30
Payer: MEDICARE

## 2023-12-30 DIAGNOSIS — Z96.1: ICD-10-CM

## 2023-12-30 PROCEDURE — 99024 POSTOP FOLLOW-UP VISIT: CPT | Performed by: SPECIALIST

## 2023-12-30 ASSESSMENT — CONFRONTATIONAL VISUAL FIELD TEST (CVF)
OS_FINDINGS: FULL
OD_FINDINGS: FULL

## 2023-12-30 ASSESSMENT — CORNEAL EDEMA - FOLDS/STRIAE: OS_FOLDSSTRIAE: 1+

## 2024-01-09 ENCOUNTER — OFFICE (OUTPATIENT)
Dept: URBAN - METROPOLITAN AREA CLINIC 104 | Facility: CLINIC | Age: 64
Setting detail: OPHTHALMOLOGY
End: 2024-01-09
Payer: MEDICAID

## 2024-01-09 DIAGNOSIS — Z96.1: ICD-10-CM

## 2024-01-09 PROCEDURE — 99024 POSTOP FOLLOW-UP VISIT: CPT | Performed by: OPTOMETRIST

## 2024-01-09 ASSESSMENT — CORNEAL EDEMA - FOLDS/STRIAE: OS_FOLDSSTRIAE: 1+

## 2024-01-09 ASSESSMENT — SPHEQUIV_DERIVED
OD_SPHEQUIV: 0
OS_SPHEQUIV: -0.375

## 2024-01-09 ASSESSMENT — REFRACTION_AUTOREFRACTION
OD_SPHERE: +0.50
OS_SPHERE: +0.75
OD_AXIS: 133
OS_CYLINDER: -2.25
OS_AXIS: 028
OD_CYLINDER: -1.00

## 2024-01-09 ASSESSMENT — CONFRONTATIONAL VISUAL FIELD TEST (CVF)
OD_FINDINGS: FULL
OS_FINDINGS: FULL

## 2024-02-06 ENCOUNTER — OFFICE (OUTPATIENT)
Dept: URBAN - METROPOLITAN AREA CLINIC 104 | Facility: CLINIC | Age: 64
Setting detail: OPHTHALMOLOGY
End: 2024-02-06
Payer: MEDICAID

## 2024-02-06 DIAGNOSIS — Z96.1: ICD-10-CM

## 2024-02-06 PROCEDURE — 99024 POSTOP FOLLOW-UP VISIT: CPT | Performed by: OPTOMETRIST

## 2024-02-06 ASSESSMENT — REFRACTION_AUTOREFRACTION
OD_AXIS: 133
OS_SPHERE: +0.75
OD_SPHERE: +0.50
OS_CYLINDER: -2.25
OD_CYLINDER: -1.00
OS_AXIS: 028

## 2024-02-06 ASSESSMENT — CORNEAL EDEMA - FOLDS/STRIAE: OS_FOLDSSTRIAE: 1+

## 2024-02-06 ASSESSMENT — CONFRONTATIONAL VISUAL FIELD TEST (CVF)
OD_FINDINGS: FULL
OS_FINDINGS: FULL

## 2024-02-06 ASSESSMENT — SPHEQUIV_DERIVED
OS_SPHEQUIV: -0.375
OD_SPHEQUIV: 0

## 2024-04-17 NOTE — H&P ADULT. - EXTREMITIES
Meghan Olivia is a 31 year old female presenting to the walk-in clinic today with c/o \"cough since last week. It has persisted and gotten worse. I have be taking Vicks 44 (liquid) since Monday night, using cough drops, and drinking tea. The coughing is starting to hurt my diaphragm I think or that area. Also I am pregnant\" she is 21 weeks pregnant.   The symptoms started 1 week ago. Denies HX of allergies or asthma.     Vitals:    04/17/24 0933   BP: 122/70   Pulse: 88   Resp: 16   Temp: 97.8 °F (36.6 °C)       Denies known Latex allergy or symptoms of Latex sensitivity.  Medications reviewed.  Pharmacy updated.    Patient would like communication of their results via:      Cell Phone:   Telephone Information:   Mobile [phone]     Okay to leave a message containing results? Yes   No cyanosis, clubbing or edema

## 2024-06-25 ENCOUNTER — OFFICE (OUTPATIENT)
Dept: URBAN - METROPOLITAN AREA CLINIC 104 | Facility: CLINIC | Age: 64
Setting detail: OPHTHALMOLOGY
End: 2024-06-25
Payer: MEDICARE

## 2024-06-25 DIAGNOSIS — Z96.1: ICD-10-CM

## 2024-06-25 DIAGNOSIS — H16.223: ICD-10-CM

## 2024-06-25 PROCEDURE — 92014 COMPRE OPH EXAM EST PT 1/>: CPT | Performed by: OPTOMETRIST

## 2024-06-25 ASSESSMENT — CONFRONTATIONAL VISUAL FIELD TEST (CVF)
OS_FINDINGS: FULL
OD_FINDINGS: FULL

## 2025-07-10 ENCOUNTER — OFFICE (OUTPATIENT)
Dept: URBAN - METROPOLITAN AREA CLINIC 104 | Facility: CLINIC | Age: 65
Setting detail: OPHTHALMOLOGY
End: 2025-07-10
Payer: MEDICARE

## 2025-07-10 DIAGNOSIS — Z96.1: ICD-10-CM

## 2025-07-10 DIAGNOSIS — H16.223: ICD-10-CM

## 2025-07-10 PROCEDURE — 92014 COMPRE OPH EXAM EST PT 1/>: CPT | Performed by: OPTOMETRIST

## 2025-07-10 ASSESSMENT — REFRACTION_MANIFEST
OD_ADD: +2.50
OS_VA1: 20/NI
OS_AXIS: 057
OD_AXIS: 111
OD_SPHERE: +0.50
OD_VA1: 20/NI
OD_CYLINDER: -1.00
OS_CYLINDER: -0.50
OS_SPHERE: -1.00
OS_ADD: +2.50

## 2025-07-10 ASSESSMENT — CONFRONTATIONAL VISUAL FIELD TEST (CVF)
OD_FINDINGS: FULL
OS_FINDINGS: FULL

## 2025-07-10 ASSESSMENT — REFRACTION_CURRENTRX
OD_ADD: +2.25
OS_ADD: +2.25
OS_OVR_VA: 20/
OS_AXIS: 059
OD_SPHERE: +0.75
OD_CYLINDER: -0.75
OS_CYLINDER: -0.50
OD_OVR_VA: 20/
OD_AXIS: 097
OS_SPHERE: -0.50

## 2025-07-10 ASSESSMENT — KERATOMETRY
OS_K1POWER_DIOPTERS: 44.00
OD_AXISANGLE_DEGREES: 005
OS_AXISANGLE_DEGREES: 124
OS_K2POWER_DIOPTERS: 44.35
OD_K2POWER_DIOPTERS: 43.89
OD_K1POWER_DIOPTERS: 42.99

## 2025-07-10 ASSESSMENT — SUPERFICIAL PUNCTATE KERATITIS (SPK)
OD_SPK: T
OS_SPK: T

## 2025-07-10 ASSESSMENT — REFRACTION_AUTOREFRACTION
OD_SPHERE: +0.50
OS_CYLINDER: -0.50
OS_SPHERE: -1.00
OD_AXIS: 111
OD_CYLINDER: -1.00
OS_AXIS: 057

## 2025-07-10 ASSESSMENT — VISUAL ACUITY
OD_BCVA: 20/40+1
OS_BCVA: 20/20-2